# Patient Record
Sex: FEMALE | Race: WHITE | Employment: OTHER | URBAN - METROPOLITAN AREA
[De-identification: names, ages, dates, MRNs, and addresses within clinical notes are randomized per-mention and may not be internally consistent; named-entity substitution may affect disease eponyms.]

---

## 2017-01-01 ENCOUNTER — OFFICE VISIT (OUTPATIENT)
Dept: PAIN MANAGEMENT | Age: 67
End: 2017-01-01

## 2017-01-01 VITALS
HEART RATE: 69 BPM | DIASTOLIC BLOOD PRESSURE: 60 MMHG | SYSTOLIC BLOOD PRESSURE: 105 MMHG | TEMPERATURE: 98.4 F | RESPIRATION RATE: 14 BRPM | BODY MASS INDEX: 23.92 KG/M2 | WEIGHT: 162 LBS

## 2017-01-01 DIAGNOSIS — G89.4 CHRONIC PAIN SYNDROME: ICD-10-CM

## 2017-01-01 DIAGNOSIS — R51.9 CHRONIC INTRACTABLE HEADACHE, UNSPECIFIED HEADACHE TYPE: ICD-10-CM

## 2017-01-01 DIAGNOSIS — M79.605 PAIN OF LEFT LOWER EXTREMITY: ICD-10-CM

## 2017-01-01 DIAGNOSIS — G89.29 CHRONIC INTRACTABLE HEADACHE, UNSPECIFIED HEADACHE TYPE: ICD-10-CM

## 2017-01-01 DIAGNOSIS — M79.2 NEURALGIA: ICD-10-CM

## 2017-01-01 DIAGNOSIS — Z89.612 STATUS POST ABOVE KNEE AMPUTATION OF LEFT LOWER EXTREMITY: ICD-10-CM

## 2017-01-01 DIAGNOSIS — G54.6 PHANTOM LIMB PAIN (HCC): Primary | ICD-10-CM

## 2017-01-01 DIAGNOSIS — G89.29 INSOMNIA SECONDARY TO CHRONIC PAIN: ICD-10-CM

## 2017-01-01 DIAGNOSIS — G47.01 INSOMNIA SECONDARY TO CHRONIC PAIN: ICD-10-CM

## 2017-01-01 DIAGNOSIS — I73.9 PAD (PERIPHERAL ARTERY DISEASE) (HCC): ICD-10-CM

## 2017-01-01 DIAGNOSIS — Z79.899 ENCOUNTER FOR LONG-TERM (CURRENT) DRUG USE: ICD-10-CM

## 2017-01-01 DIAGNOSIS — Z79.899 ENCOUNTER FOR LONG-TERM (CURRENT) USE OF HIGH-RISK MEDICATION: ICD-10-CM

## 2017-01-01 RX ORDER — METHADONE HYDROCHLORIDE 10 MG/1
20 TABLET ORAL 4 TIMES DAILY
Qty: 240 TAB | Refills: 0 | Status: SHIPPED | OUTPATIENT
Start: 2017-01-01 | End: 2018-01-01 | Stop reason: SDUPTHER

## 2017-01-01 RX ORDER — METHADONE HYDROCHLORIDE 10 MG/1
20 TABLET ORAL 4 TIMES DAILY
Qty: 240 TAB | Refills: 0 | Status: SHIPPED | OUTPATIENT
Start: 2017-01-01 | End: 2017-01-01 | Stop reason: SDUPTHER

## 2017-01-01 RX ORDER — OXYCODONE HYDROCHLORIDE 15 MG/1
15 TABLET ORAL
Qty: 120 TAB | Refills: 0 | Status: SHIPPED | OUTPATIENT
Start: 2017-01-01 | End: 2018-01-01 | Stop reason: SDUPTHER

## 2017-01-01 RX ORDER — OXYCODONE HYDROCHLORIDE 15 MG/1
15 TABLET ORAL
Qty: 120 TAB | Refills: 0 | Status: SHIPPED | OUTPATIENT
Start: 2017-01-01 | End: 2017-01-01 | Stop reason: SDUPTHER

## 2017-01-01 RX ORDER — ENOXAPARIN SODIUM 150 MG/ML
120 INJECTION SUBCUTANEOUS DAILY
COMMUNITY
Start: 2017-09-18

## 2017-02-07 ENCOUNTER — OFFICE VISIT (OUTPATIENT)
Dept: PAIN MANAGEMENT | Age: 67
End: 2017-02-07

## 2017-02-07 VITALS
DIASTOLIC BLOOD PRESSURE: 55 MMHG | HEART RATE: 62 BPM | WEIGHT: 153 LBS | SYSTOLIC BLOOD PRESSURE: 110 MMHG | BODY MASS INDEX: 22.66 KG/M2 | HEIGHT: 69 IN

## 2017-02-07 DIAGNOSIS — I73.9 PAD (PERIPHERAL ARTERY DISEASE) (HCC): ICD-10-CM

## 2017-02-07 DIAGNOSIS — G54.6 PHANTOM LIMB PAIN (HCC): Primary | ICD-10-CM

## 2017-02-07 DIAGNOSIS — M79.605 PAIN OF LEFT LOWER EXTREMITY: ICD-10-CM

## 2017-02-07 DIAGNOSIS — M79.2 NEURALGIA: ICD-10-CM

## 2017-02-07 DIAGNOSIS — Z89.612 STATUS POST ABOVE KNEE AMPUTATION OF LEFT LOWER EXTREMITY: ICD-10-CM

## 2017-02-07 DIAGNOSIS — Z79.899 ENCOUNTER FOR LONG-TERM (CURRENT) DRUG USE: ICD-10-CM

## 2017-02-07 DIAGNOSIS — G89.4 CHRONIC PAIN SYNDROME: ICD-10-CM

## 2017-02-07 LAB
ALCOHOL UR POC: NORMAL
AMPHETAMINES UR POC: NORMAL
BARBITURATES UR POC: NORMAL
BENZODIAZEPINES UR POC: NORMAL
BUPRENORPHINE UR POC: NORMAL
CANNABINOIDS UR POC: NORMAL
CARISOPRODOL UR POC: NORMAL
COCAINE UR POC: NORMAL
FENTANYL UR POC: NORMAL
MDMA/ECSTASY UR POC: NORMAL
METHADONE UR POC: NORMAL
METHAMPHETAMINE UR POC: NORMAL
METHYLPHENIDATE UR POC: NORMAL
OPIATES UR POC: NORMAL
OXYCODONE UR POC: NORMAL
PHENCYCLIDINE UR POC: NORMAL
PROPOXYPHENE UR POC: NORMAL
TRAMADOL UR POC: NORMAL
TRICYCLICS UR POC: NORMAL

## 2017-02-07 RX ORDER — HYDROCODONE BITARTRATE AND ACETAMINOPHEN 10; 325 MG/1; MG/1
1 TABLET ORAL
Qty: 120 TAB | Refills: 0 | Status: SHIPPED | OUTPATIENT
Start: 2017-02-07 | End: 2017-02-07 | Stop reason: SDUPTHER

## 2017-02-07 RX ORDER — METHADONE HYDROCHLORIDE 10 MG/1
20 TABLET ORAL 4 TIMES DAILY
Qty: 240 TAB | Refills: 0 | Status: SHIPPED | OUTPATIENT
Start: 2017-02-18 | End: 2017-02-07 | Stop reason: SDUPTHER

## 2017-02-07 RX ORDER — METHADONE HYDROCHLORIDE 10 MG/1
20 TABLET ORAL 4 TIMES DAILY
Qty: 240 TAB | Refills: 0 | Status: SHIPPED | OUTPATIENT
Start: 2017-04-17 | End: 2017-05-02 | Stop reason: SDUPTHER

## 2017-02-07 RX ORDER — HYDROCODONE BITARTRATE AND ACETAMINOPHEN 10; 325 MG/1; MG/1
1 TABLET ORAL
Qty: 120 TAB | Refills: 0 | Status: SHIPPED | OUTPATIENT
Start: 2017-04-06 | End: 2017-05-02

## 2017-02-07 RX ORDER — HYDROCODONE BITARTRATE AND ACETAMINOPHEN 10; 325 MG/1; MG/1
1 TABLET ORAL
Qty: 120 TAB | Refills: 0 | Status: SHIPPED | OUTPATIENT
Start: 2017-03-08 | End: 2017-02-07 | Stop reason: SDUPTHER

## 2017-02-07 RX ORDER — METHADONE HYDROCHLORIDE 10 MG/1
20 TABLET ORAL 4 TIMES DAILY
Qty: 240 TAB | Refills: 0 | Status: SHIPPED | OUTPATIENT
Start: 2017-03-19 | End: 2017-02-07 | Stop reason: SDUPTHER

## 2017-02-07 NOTE — PATIENT INSTRUCTIONS
Plan:  Continue same medications as prescribed for chronic pain  Stop oxycodone and change to Norco 10/325 up to four times daily as needed for pain flares  Research [employer] Janes DRG stimulator FOR PHANTOM LIMB PAIN  Follow up in 3 months or sooner if needed  Regular exercise and attention to emotional health and diet remain the most effective ways to treat chronic pain of all kinds  You may contact me with questions or concerns through [address] [place] [address] are in possession of medication (OXYCODONE 15 MG) that is no longer part of your active treatment regimen. We strongly advise that you properly dispose of it as quickly as possible to help reduce the chance that others may accidentally take or intentionally misuse the discontinued medication. Please understand that ongoing use or distribution of a discontinued medication containing a controlled substance is a violation of your signed Controlled Substance Consent & Treatment Agreement and will result in dismissal from our practice. Listed below are some options and special instructions to consider when disposing of discontinued, , unwanted, or unused medications:    · Drug [employer] (GENO) authorized collectors cansafely and securely collect and dispose of pharmaceuticals containing controlled substances and other medications. Authorized collection sites may be [employer], hospital or clinic pharmacies, and law enforcement locations. For GENO-authorized collectors near you, contact the [place] of [address] [facility] at [phone] or visit the following web address: [employer].. ????.gov/pubdispsearch/spring/main?execution=e1s1.       GENO-authorized collectors within the local [address] [place] [address] include:  [address] [address], [address]    [facility]  BHARATI/Ori Elise, [address] , [place] [address] PHARMACY AT [facility]  [address] [place] [address], [address]    [address]  [address], [address], [address]    · Medicine take-back programs are another good way to safely dispose of most types of discontinued medications. The GENO periodically hosts National Prescription Drug Take-Back events where collection sites are set up in communities nationwide for safe disposal of prescription drugs. Local law enforcement agencies may also sponsor medication take-back programs. · If unable to reach a medication take-back program or GENO-authorized , you can also follow these simple steps to dispose of medications in the household trash:  1. Mix medications (do not crush tablets or capsules) with an unappetizing substance such as dirt, kade litter, or used coffee grounds;  2. Place the mixture in a container such as a sealed plastic bag;  3. Throw the container in your household trash;  Scratch out all personal information on the prescription label of your empty pill bottle or empty medication packaging to make it unreadable, then dispose of the container.

## 2017-02-07 NOTE — PROGRESS NOTES
HISTORY OF PRESENT ILLNESS  Eliot Oshea is a 73 y.o. female. She returns for followup of chronic, severe pain which includes phantom pain status post left AKA as well as chronic bilateral leg pain due to severe PAD. She reports that recent fluctuations in the weather has worsened pain significantly. Pain continues to predominate in the left leg stump, which she describes as burning, stabbing, and sharp. Pain is constant but worsens with physical exertion, inclement weather, and stress. She has recently restarted smoking due to the stress of her pain, and we discussed the potential ramifications of this, and encouraged that she quit again. She reports that methadone was not adequately managing her chronic pain alone, so Dr. Laith Silva restarted oxycodone for breakthrough pain at her last office visit. However, she notes that she has difficulty breathing for several hours after taking a dose of oxycodone. We discussed that this is an potentially serious side effect, particularly with her comorbid health conditions and recommend she change to a less-potent opioid for breakthrough pain. We discussed treatment options at length--see treatment plan below. The remainder of her pain generators remain constant but stable. Pt reports average of 5/10 pain scale most days. She denies any new symptoms. Methadone 10 mg continues to work modestly well for pain control overall. Eliot Oshea is tolerating medications well, with no untoward side effects noted. She is able to stay more active with less discomfort with these current doses. The patient reports an average of 40 % relief with this regimen. Most recent UDS and  were consistent with prescribed medications. Pill counts are appropriate.  She is informed of side effects, risks, and benefits of this regimen, and emphasizes that she derives a significant improvement in functionality and quality of life, and notes that non-opioid medications and therapies in the past have not offered significant benefit. She denies new or worsening insomnia or constipation issues. She denies any falls, injuries, or hospitalizations since the last visit. A total of 45 minutes was spent with the patient of which more than 50% of the time was spent counseling the patient. HPI--see above    ROS    Physical Exam    ASSESSMENT and PLAN    ICD-10-CM ICD-9-CM    1. Phantom limb pain (HCC) G54.6 353.6    2. Neuralgia M79.2 729.2    3. Pain of left lower extremity M79.605 729.5    4. Status post above knee amputation of left lower extremity ([facility] Z89.612 V49.76    5. Encounter for long-term (current) drug use Z79.899 V58.69 DRUG SCREEN      AMB POC DRUG SCREEN ()   6. Chronic pain syndrome G89.4 338.4    7.  PAD (peripheral artery disease) ([facility]) I73.9 443.9       Plan:  Continue same medications as prescribed for chronic pain  Stop oxycodone and change to Norco 10/325 up to four times daily as needed for pain flares  Research [employer] Janes DRG stimulator  Follow up in 3 months or sooner if needed  Regular exercise and attention to emotional health and diet remain the most effective ways to treat chronic pain of all kinds  You may contact me with questions or concerns through [address]

## 2017-02-07 NOTE — PROGRESS NOTES
Nursing Notes    Patient presents to the office today in follow-up. Patient rates her pain at 4/10 on the numerical pain scale. Reviewed medications with counts as follows:    Rx Date filled Qty Dispensed Pill Count Last Dose Short   Methadone 10 mg  01/20/17 240 103 today no   Oxycodone 15 mg  01/15/17 120 60 yesterday no                           POC UDS was performed in office today    Any new labs or imaging since last appointment? NO    Have you been to an emergency room (ER) or urgent care clinic since your last visit? NO            Have you been hospitalized since your last visit? NO     If yes, where, when, and reason for visit? Have you seen or consulted any other health care providers outside of the [address] [place] [address]  since your last visit? YES     If yes, where, when, and reason for visit? GI    HM deferred to pcp.

## 2017-02-07 NOTE — MR AVS SNAPSHOT
Visit Information Date & Time Provider Department Dept. Phone Encounter #  
 2/7/2017  2:00 PM Castro Salguero [facility] [phone] [id number] Follow-up Instructions Return in about 3 months (around 5/7/2017). Upcoming Health Maintenance Date Due Hepatitis C Screening 1950 DTaP/Tdap/Td series (1 - Tdap) 10/30/1972 BREAST CANCER SCRN MAMMOGRAM 10/30/2001 FOBT Q 1 YEAR AGE 50-75 10/30/2001 ZOSTER VACCINE AGE 60> 10/30/2011 INFLUENZA AGE 9 TO ADULT 8/1/2016 GLAUCOMA SCREENING Q2Y 10/30/2016 OSTEOPOROSIS SCREENING (DEXA) 10/30/2016 Pneumococcal 65+ Low/Medium Risk (1 of 2 - PCV13) 10/30/2016 MEDICARE YEARLY EXAM 10/30/2016 Allergies as of 2/7/2017  Review Complete On: 2/7/2017 By: Amanda Agustin LPN Severity Noted Reaction Type Reactions Heparin Analogues  08/19/2014   Side Effect Other (comments) Blood clotted resulted her to have amputation of the leg Iodinated Contrast Media - Oral And Iv Dye    Hives Current Immunizations  Never Reviewed No immunizations on file. Not reviewed this visit You Were Diagnosed With   
  
 Codes Comments Phantom limb pain ([facility]    -  Primary ICD-10-CM: G54.6 ICD-9-CM: 353.6 Neuralgia     ICD-10-CM: M79.2 ICD-9-CM: 729.2 Pain of left lower extremity     ICD-10-CM: M79.605 ICD-9-CM: 729.5 Status post above knee amputation of left lower extremity ([facility]     ICD-10-CM: J09.213 ICD-9-CM: V49.76 Encounter for long-term (current) drug use     ICD-10-CM: Z79.899 ICD-9-CM: V58.69 Chronic pain syndrome     ICD-10-CM: G89.4 ICD-9-CM: 338.4 PAD (peripheral artery disease) (HCC)     ICD-10-CM: I73.9 ICD-9-CM: 443. 9 Vitals BP Pulse Height(growth percentile) Weight(growth percentile) BMI OB Status 110/55 62 5' 9\" (1.753 m) 153 lb (69.4 kg) 22.59 kg/m2 Hysterectomy Smoking Status Former Smoker Vitals History BMI and BSA Data Body Mass Index Body Surface Area  
 22.59 kg/m 2 1.84 m 2 Preferred Pharmacy Pharmacy Name Phone [id number] - [place] [phone] Your Updated Medication List  
  
   
This list is accurate as of: 2/7/17  3:01 PM.  Always use your most recent med list.  
  
  
  
  
 albuterol 90 mcg/actuation inhaler Commonly known as:  PROVENTIL HFA, VENTOLIN HFA, PROAIR HFA Take 2 Puffs by inhalation as needed for Wheezing. Indications: asthma  
  
 atorvastatin 80 mg tablet Commonly known as:  LIPITOR Take 80 mg by mouth nightly. Indications: HYPERCHOLESTEROLEMIA  
  
 carvedilol 12.5 mg tablet Commonly known as:  Monda Lovings Take 12.5 mg by mouth two (2) times daily (with meals). Indications: HYPERTENSION, CHF  
  
 CORRECTOL 5 mg Tab Generic drug:  bisacodyl Take  by mouth. furosemide 40 mg tablet Commonly known as:  LASIX Take 40 mg by mouth daily as needed. gabapentin 800 mg tablet Commonly known as:  NEURONTIN Take 300 mg by mouth three (3) times daily. Indications: NEUROPATHIC PAIN  
  
 guaiFENesin  mg ER tablet Commonly known as:  Cm & Cm Take 600 mg by mouth two (2) times daily as needed for Congestion. HYDROcodone-acetaminophen  mg tablet Commonly known as:  Redd Minor Take 1 Tab by mouth four (4) times daily as needed for Pain. Max Daily Amount: 4 Tabs. Start taking on:  4/6/2017 Iron 325 mg (65 mg iron) tablet Generic drug:  ferrous sulfate Take  by mouth two (2) times daily (with meals). LORazepam 1 mg tablet Commonly known as:  ATIVAN Take 0.5 mg by mouth nightly as needed for Anxiety. losartan 25 mg tablet Commonly known as:  COZAAR Take 25 mg by mouth daily. Indications: HYPERTENSION  
  
 methadone 10 mg tablet Commonly known as:  DOLOPHINE  
 Take 2 Tabs by mouth four (4) times daily for 30 days. Max Daily Amount: 80 mg. for chronic, severe, refractory pain. Indications: Chronic Pain, Severe Pain Start taking on:  4/17/2017  
  
 omeprazole 20 mg capsule Commonly known as:  PRILOSEC Take 20 mg by mouth daily. Indications: GASTROESOPHAGEAL REFLUX polyvinyl alcohol 1.4 % ophthalmic solution Commonly known as:  Camilo Bitters Administer 1 Drop to both eyes as needed. potassium chloride 20 mEq packet Commonly known as:  KLOR-CON Take 20 mEq by mouth two (2) times a day. Indications: HYPOKALEMIA PREVENTION  
  
 senna-docusate 8.6-50 mg per tablet Commonly known as:  Miachel Huge Take 1 Tab by mouth as needed for Constipation. Alternates with Docusate sodium. spironolactone 25 mg tablet Commonly known as:  ALDACTONE Take 25 mg by mouth daily. Indications: EDEMA  
  
 tiotropium 18 mcg inhalation capsule Commonly known as:  Nighat Gilmer Take 1 Cap by inhalation two (2) times a day. venlafaxine- mg capsule Commonly known as:  EFFEXOR-XR  
TAKE 1 CAPSULE BY MOUTH DAILY FOR 30 DAYS  
  
 XARELTO 10 mg tablet Generic drug:  rivaroxaban Take  by mouth daily. Prescriptions Printed Refills  
 methadone (DOLOPHINE) 10 mg tablet 0 Starting on: 4/17/2017 Sig: Take 2 Tabs by mouth four (4) times daily for 30 days. Max Daily Amount: 80 mg. for chronic, severe, refractory pain. Indications: Chronic Pain, Severe Pain Class: Print Route: Oral  
 HYDROcodone-acetaminophen (NORCO)  mg tablet 0 Starting on: 4/6/2017 Sig: Take 1 Tab by mouth four (4) times daily as needed for Pain. Max Daily Amount: 4 Tabs. Class: Print Route: Oral  
  
Follow-up Instructions Return in about 3 months (around 5/7/2017). Patient Instructions Plan: 
Continue same medications as prescribed for chronic pain Stop oxycodone and change to Norco 10/325 up to four times daily as needed for pain flares Research [employer] Janes DRG stimulator FOR PHANTOM LIMB PAIN Follow up in 3 months or sooner if needed Regular exercise and attention to emotional health and diet remain the most effective ways to treat chronic pain of all kinds You may contact me with questions or concerns through [address] You are in possession of medication (OXYCODONE 15 MG) that is no longer part of your active treatment regimen. We strongly advise that you properly dispose of it as quickly as possible to help reduce the chance that others may accidentally take or intentionally misuse the discontinued medication. Please understand that ongoing use or distribution of a discontinued medication containing a controlled substance is a violation of your signed Controlled Substance Consent & Treatment Agreement and will result in dismissal from our practice. Listed below are some options and special instructions to consider when disposing of discontinued, , unwanted, or unused medications: · Drug Encorcement Agency (GENO) authorized collectors cansafely and securely collect and dispose of pharmaceuticals containing controlled substances and other medications. Authorized collection sites may be [employer], hospital or clinic pharmacies, and law enforcement locations. For GENO-authorized collectors near you, contact the [place] of [address] [address] [facility] at [phone] or visit the following web address: [email or web address]/pubdispsearch/spring/main?execution=e1s1. GENO-authorized collectors within the local [address] [place] [address] include: 
[address], [address] [facility] 
BHARATI/Ori Elise [place] [address] [place] [address] [place], [address] 
[address] [place], [place] [address], [address] · Medicine take-back programs are another good way to safely dispose of most types of discontinued medications. The GNEO periodically hosts National Prescription Drug Take-Back events where collection sites are set up in communities nationwide for safe disposal of prescription drugs. Local law enforcement agencies may also sponsor medication take-back programs. · If unable to reach a medication take-back program or GENO-authorized , you can also follow these simple steps to dispose of medications in the household trash: 
1. Mix medications (do not crush tablets or capsules) with an unappetizing substance such as dirt, kade litter, or used coffee grounds; 2. Place the mixture in a container such as a sealed plastic bag; 
3. Throw the container in your household trash; 
Scratch out all personal information on the prescription label of your empty pill bottle or empty medication packaging to make it unreadable, then dispose of the container. Introducing [facility] & HEALTH SERVICES! Dear Lilyan Severs: Thank you for requesting a [employer] account. Our records indicate that you already have an active [employer] account. You can access your account anytime at [email or web address] Did you know that you can access your hospital and ER discharge instructions at any time in [employer]? You can also review all of your test results from your hospital stay or ER visit. Additional Information If you have questions, please visit the Frequently Asked Questions section of the [employer] website at [email or web address] Remember, [employer] is NOT to be used for urgent needs. For medical emergencies, dial 911. Now available from your iPhone and Android! Please provide this summary of care documentation to your next provider. Your primary care clinician is listed as Barry Garcia. If you have any questions after today's visit, please call [phone].

## 2017-05-02 ENCOUNTER — OFFICE VISIT (OUTPATIENT)
Dept: PAIN MANAGEMENT | Age: 67
End: 2017-05-02

## 2017-05-02 VITALS
HEIGHT: 69 IN | BODY MASS INDEX: 22.66 KG/M2 | HEART RATE: 67 BPM | WEIGHT: 153 LBS | SYSTOLIC BLOOD PRESSURE: 110 MMHG | DIASTOLIC BLOOD PRESSURE: 59 MMHG

## 2017-05-02 DIAGNOSIS — N13.30 HYDRONEPHROSIS, BILATERAL: ICD-10-CM

## 2017-05-02 DIAGNOSIS — Z79.899 ENCOUNTER FOR LONG-TERM (CURRENT) USE OF HIGH-RISK MEDICATION: ICD-10-CM

## 2017-05-02 DIAGNOSIS — Z89.612 STATUS POST ABOVE KNEE AMPUTATION OF LEFT LOWER EXTREMITY: ICD-10-CM

## 2017-05-02 DIAGNOSIS — G54.6 PHANTOM LIMB PAIN (HCC): Primary | ICD-10-CM

## 2017-05-02 DIAGNOSIS — I73.9 PAD (PERIPHERAL ARTERY DISEASE) (HCC): ICD-10-CM

## 2017-05-02 DIAGNOSIS — M79.605 PAIN OF LEFT LOWER EXTREMITY: ICD-10-CM

## 2017-05-02 DIAGNOSIS — G47.01 INSOMNIA SECONDARY TO CHRONIC PAIN: ICD-10-CM

## 2017-05-02 DIAGNOSIS — M79.2 NEURALGIA: ICD-10-CM

## 2017-05-02 DIAGNOSIS — Z79.899 ENCOUNTER FOR LONG-TERM (CURRENT) DRUG USE: ICD-10-CM

## 2017-05-02 DIAGNOSIS — G89.29 INSOMNIA SECONDARY TO CHRONIC PAIN: ICD-10-CM

## 2017-05-02 RX ORDER — METHADONE HYDROCHLORIDE 10 MG/1
20 TABLET ORAL 4 TIMES DAILY
Qty: 240 TAB | Refills: 0 | Status: SHIPPED | OUTPATIENT
Start: 2017-05-16 | End: 2017-06-27 | Stop reason: SDUPTHER

## 2017-05-02 NOTE — PROGRESS NOTES
HISTORY OF PRESENT ILLNESS  Lawrence Andrew is a 73 y.o. female. She returns for followup of chronic, severe pain which includes phantom pain status post left AKA as well as chronic bilateral leg pain due to severe PAD. She reports that the change to Norco from percocet for breakthrough pain has not been effective for her severe pain flares. She reports that pain has been severe since her last visit in spite of her high methadone doses, and her quality of life and functionality is rated as poor. Pain continues to predominate in the left leg stump, which she describes as burning, stabbing, and sharp. Pain is constant but worsens with physical exertion, inclement weather, and stress. Pt reports average of 7/10 pain scale most days with medications. She also reports that her chronic anemia has become profound, and she underwent transfusion about two weeks ago when her Hbg reached 7.2. She reports that she is losing blood in her urine due to her in-dwelling uretal stents. She also tested positive on fecal occult blood, but she has not been able to have a colonoscopy or endoscopy due to her underlying cardiac issues and chronic [place]. She denies taking any NSAIDs, but does admit to taking additional Tylenol due to increased pain. She is also still being treated for cardiovascular disease, and has an occlusion that likely needs stenting, but she cannot have this performed due to profound anemia. \"It's a dog chasing its tail. \" she is experiencing frequent attacks of angina, which is managed also by her cardiologist. She has quit smoking in the past three months. She is not considered a interventional or surgical candidate for her pain due to her extensive medical co morbidities. This may change pending improvement with anemia and cardiovascular issues. She does not feel that her medications are offering any meaningful pain relief.  She would like to switch to a different long acting medication, which will be difficult considering her very high doses of methadone (currently at 80 mg per day, which is 400 MME/day). She has also tried and failed Exalgo, fentanyl, morphine, hydrocodone, and oxycodone. After prolonged discussion on the risks and limitations of opioids in treating chronic pain, she elects to stay with methadone one more month and switch her breakthrough pain medication to Nucynta 50 mg , which will hopefully be more successful in treating her neuropathic pain. She denies new or worsening insomnia or constipation issues. A total of 50 minutes was spent with the patient of which more than 50% of the time was spent counseling the patient. HPI--see above    ROS  Constitutional: Negative for weight loss. Respiratory: Negative for shortness of breath. Gastrointestinal: Negative for constipation. Musculoskeletal: Positive for myalgias, back pain, joint pain and neck pain. Negative for falls. Skin: Negative for rash. Neurological: Positive for sensory change. Memory loss   Physical Exam  Constitutional: She is oriented to person, place, and time. She appears well-developed and well-nourished. She is cooperative. No distress. HENT:   Head: Normocephalic and atraumatic. Pulmonary/Chest: Effort normal. No respiratory distress. Musculoskeletal:        Right upper leg: She exhibits deformity (AKA). Neurological: She is alert and oriented to person, place, and time. Gait abnormal.   Skin: Skin is warm and dry. She is not diaphoretic. Psychiatric: She has a normal mood and affect. Her speech is normal and behavior is normal. Thought content normal.   ASSESSMENT and PLAN    ICD-10-CM ICD-9-CM    1. Phantom limb pain (HCC) G54.6 353.6    2. Neuralgia M79.2 729.2    3. Pain of left lower extremity M79.605 729.5    4. Status post above knee amputation of left lower extremity ([facility] Z89.612 V49.76    5. Encounter for long-term (current) drug use Z79.899 V58.69    6.  PAD (peripheral artery disease) ([facility] I73.9 443.9    7. Encounter for long-term (current) use of high-risk medication Z79.899 V58.69    8. Hydronephrosis, bilateral N13.30 591    9.  Insomnia secondary to chronic pain G89.29 338.29     G47.01 327.01       Plan:  Continue same dose of methadone as prescribed for chronic pain for now  We will try Nucynta 100 mg for severe pain flares to see if this better addresses your phantom limb nerve pain  Stop [place]  Follow up in 1 months or sooner if needed  Regular exercise and attention to emotional health and diet remain the most effective ways to treat chronic pain of all kinds  You may contact me with questions or concerns through [employer]

## 2017-05-02 NOTE — PATIENT INSTRUCTIONS
Plan:  Continue same dose of methadone as prescribed for chronic pain for now  We will try Nucynta 100 mg for severe pain flares to see if this better addresses your phantom limb nerve pain  Per the [facility] recommendation, we will prescribe Naloxone 4 mg nasal spray to use in case of accidental overdose. Do no use Naloxone for any reasons other than opioid toxicity, as its use may precipitate withdrawals. Stop Norco  Follow up in 1 months or sooner if needed  Regular exercise and attention to emotional health and diet remain the most effective ways to treat chronic pain of all kinds  You may contact me with questions or concerns through [address] [place] [address] are in possession of medication that is no longer part of your active treatment regimen ( Joyice Breeze and oxycodone). We strongly advise that you properly dispose of it as quickly as possible to help reduce the chance that others may accidentally take or intentionally misuse the discontinued medication. Please understand that ongoing use or distribution of a discontinued medication containing a controlled substance is a violation of your signed Controlled Substance Consent & Treatment Agreement and will result in dismissal from our practice. Listed below are some options and special instructions to consider when disposing of discontinued, , unwanted, or unused medications:    · Drug [employer] (GENO) authorized collectors cansafely and securely collect and dispose of pharmaceuticals containing controlled substances and other medications. Authorized collection sites may be [employer], hospital or clinic pharmacies, and law enforcement locations. For GENO-authorized collectors near you, contact the [facility] of [address] [place] [address] at [phone] or visit the following web address: [email or web address] Easy [employer]i.gov/pubdispsearch/spring/main?execution=[email or web address].       GENO-authorized collectors within the local [address] [place] [address] include:  [facility]  [address] [facility]  Emilie, [address]    [facility]  [facility]  BHARATI/Santino Carvajal [address] , [facility] [address]    [address]  [address] Corrine Blake [address], [address]    [address]  [address], Santino [address], [address] [facility]    · Medicine take-back programs are another good way to safely dispose of most types of discontinued medications. The [employer]ENO periodically hosts National Prescription Drug Take-Back events where collection sites are set up in communities nationwide for safe disposal of prescription drugs. Local law enforcement agencies may also sponsor medication take-back programs. · If unable to reach a medication take-back program or GENO-authorized , you can also follow these simple steps to dispose of medications in the household trash:  1. Mix medications (do not crush tablets or capsules) with an unappetizing substance such as dirt, kade litter, or used coffee grounds;  2. Place the mixture in a container such as a sealed plastic bag;  3. Throw the container in your household trash;  4. Scratch out all personal information on the prescription label of your empty pill bottle or empty medication packaging to make it unreadable, then dispose of the container.

## 2017-05-02 NOTE — PROGRESS NOTES
Nursing Notes    Patient presents to the office today in follow-up. Patient rates her pain at 5/10 on the numerical pain scale. Reviewed medications with counts as follows:    Rx Date filled Qty Dispensed Pill Count Last Dose Short   norco 10/325 mg 04/06/17 120 18 today no   Methadone 10 mg  5/01/17 240 240 today no                            POC UDS was performed in office today. Any new labs or imaging since last appointment? NO    Have you been to an emergency room (ER) or urgent care clinic since your last visit? NO            Have you been hospitalized since your last visit? NO     If yes, where, when, and reason for visit? Have you seen or consulted any other health care providers outside of the [address] [place] [address]  since your last visit? YES     If yes, where, when, and reason for visit? Cardiology    HM deferred to pcp.

## 2017-05-02 NOTE — MR AVS SNAPSHOT
Visit Information Date & Time Provider Department Dept. Phone Encounter #  
 5/2/2017  2:00 PM Andrés Cadena [address] [place] [address] for Pain Management [id number] Follow-up Instructions Return in about 1 month (around 6/2/2017). Follow-up and Disposition History Upcoming Health Maintenance Date Due Hepatitis C Screening 1950 DTaP/Tdap/Td series (1 - Tdap) 10/30/1972 BREAST CANCER SCRN MAMMOGRAM 10/30/2001 FOBT Q 1 YEAR AGE 50-75 10/30/2001 ZOSTER VACCINE AGE 60> 10/30/2011 GLAUCOMA SCREENING Q2Y 10/30/2016 OSTEOPOROSIS SCREENING (DEXA) 10/30/2016 Pneumococcal 65+ Low/Medium Risk (1 of 2 - PCV13) 10/30/2016 MEDICARE YEARLY EXAM 10/30/2016 INFLUENZA AGE 9 TO ADULT 8/1/2017 Allergies as of 5/2/2017  Review Complete On: 5/2/2017 By: Jonny Nunez LPN Severity Noted Reaction Type Reactions Heparin Analogues  08/19/2014   Side Effect Other (comments) Blood clotted resulted her to have amputation of the leg Iodinated Contrast Media - Oral And Iv Dye    Hives Current Immunizations  Never Reviewed No immunizations on file. Not reviewed this visit You Were Diagnosed With   
  
 Codes Comments Phantom limb [facility] ([facility]    -  Primary ICD-10-CM: G54.6 ICD-9-CM: 353.6 Neuralgia     ICD-10-CM: M79.2 ICD-9-CM: 729.2 Pain of left lower extremity     ICD-10-CM: M79.605 ICD-9-CM: 729.5 Status post above knee amputation of left lower extremity ([facility]     ICD-10-CM: Z88.293 ICD-9-CM: V49.76 Encounter for long-term (current) drug use     ICD-10-CM: Z79.899 ICD-9-CM: V58.69 PAD (peripheral artery disease) (HCC)     ICD-10-CM: I73.9 ICD-9-CM: 443.9 Encounter for long-term (current) use of high-risk medication     ICD-10-CM: Z79.899 ICD-9-CM: V58.69 Hydronephrosis, bilateral     ICD-10-CM: N13.30 ICD-9-CM: 242 Insomnia secondary to chronic pain     ICD-10-CM: G89.29, G47.01 
ICD-9-CM: 338.29, 327.01 Vitals BP Pulse Height(growth percentile) Weight(growth percentile) BMI OB Status 110/59 67 5' 9\" (1.753 m) 153 lb (69.4 kg) 22.59 kg/m2 Hysterectomy Smoking Status Former Smoker Vitals History BMI and BSA Data Body Mass Index Body Surface Area  
 22.59 kg/m 2 1.84 m 2 Preferred Pharmacy Pharmacy Name Phone [id number] - [address], [address] [phone] Your Updated Medication List  
  
   
This list is accurate as of: 5/2/17  3:12 PM.  Always use your most recent med list.  
  
  
  
  
 albuterol 90 mcg/actuation inhaler Commonly known as:  PROVENTIL HFA, VENTOLIN HFA, PROAIR HFA Take 2 Puffs by inhalation as needed for Wheezing. Indications: asthma  
  
 atorvastatin 80 mg tablet Commonly known as:  LIPITOR Take 80 mg by mouth nightly. Indications: HYPERCHOLESTEROLEMIA  
  
 carvedilol 12.5 mg tablet Commonly known as:  Geannie Clos Take 12.5 mg by mouth two (2) times daily (with meals). Indications: HYPERTENSION, CHF  
  
 CORRECTOL 5 mg Tab Generic drug:  bisacodyl Take  by mouth. furosemide 40 mg tablet Commonly known as:  LASIX Take 40 mg by mouth daily as needed. gabapentin 800 mg tablet Commonly known as:  NEURONTIN Take 300 mg by mouth three (3) times daily. Indications: NEUROPATHIC PAIN  
  
 guaiFENesin  mg ER tablet Commonly known as:  Cm & Cm Take 600 mg by mouth two (2) times daily as needed for Congestion. Iron 325 mg (65 mg iron) tablet Generic drug:  ferrous sulfate Take  by mouth two (2) times daily (with meals). LORazepam 1 mg tablet Commonly known as:  ATIVAN Take 0.5 mg by mouth nightly as needed for Anxiety. losartan 25 mg tablet Commonly known as:  COZAAR Take 25 mg by mouth daily.  Indications: HYPERTENSION  
  
 methadone 10 mg tablet Commonly known as:  DOLOPHINE Take 2 Tabs by mouth four (4) times daily for 30 days. Max Daily Amount: 80 mg. for chronic, severe, refractory pain. Indications: Chronic Pain, Severe Pain Start taking on:  5/16/2017  
  
 omeprazole 20 mg capsule Commonly known as:  PRILOSEC Take 20 mg by mouth daily. Indications: GASTROESOPHAGEAL REFLUX polyvinyl alcohol 1.4 % ophthalmic solution Commonly known as:  Electa Prateek Administer 1 Drop to both eyes as needed. potassium chloride 20 mEq packet Commonly known as:  KLOR-CON Take 20 mEq by mouth two (2) times a day. Indications: HYPOKALEMIA PREVENTION  
  
 senna-docusate 8.6-50 mg per tablet Commonly known as:  Cindra Bullion Take 1 Tab by mouth as needed for Constipation. Alternates with Docusate sodium. spironolactone 25 mg tablet Commonly known as:  ALDACTONE Take 25 mg by mouth daily. Indications: EDEMA  
  
 tapentadol 100 mg tablet Commonly known as:  Rusty Duong Take 100 mg by mouth four (4) times daily as needed for Pain. Max Daily Amount: 400 mg.  
  
 tiotropium 18 mcg inhalation capsule Commonly known as:  Tacey Ld Take 1 Cap by inhalation two (2) times a day. venlafaxine- mg capsule Commonly known as:  EFFEXOR-XR  
TAKE 1 CAPSULE BY MOUTH DAILY FOR 30 DAYS  
  
 XARELTO 10 mg tablet Generic drug:  rivaroxaban Take  by mouth daily. Prescriptions Printed Refills  
 methadone (DOLOPHINE) 10 mg tablet 0 Starting on: 5/16/2017 Sig: Take 2 Tabs by mouth four (4) times daily for 30 days. Max Daily Amount: 80 mg. for chronic, severe, refractory pain. Indications: Chronic Pain, Severe Pain Class: Print Route: Oral  
 tapentadol (NUCYNTA) 100 mg tablet 0 Sig: Take 100 mg by mouth four (4) times daily as needed for Pain. Max Daily Amount: 400 mg. Class: Print Route: Oral  
  
Follow-up Instructions Return in about 1 month (around 2017). Patient Instructions Plan: 
Continue same dose of methadone as prescribed for chronic pain for now We will try Nucynta 100 mg for severe pain flares to see if this better addresses your phantom limb nerve pain Per the [facility] recommendation, we will prescribe Naloxone 4 mg nasal spray to use in case of accidental overdose. Do no use Naloxone for any reasons other than opioid toxicity, as its use may precipitate withdrawals. Stop Norco 
Follow up in 1 months or sooner if needed Regular exercise and attention to emotional health and diet remain the most effective ways to treat chronic pain of all kinds You may contact me with questions or concerns through [address] You are in possession of medication that is no longer part of your active treatment regimen ( NORCO and oxycodone). We strongly advise that you properly dispose of it as quickly as possible to help reduce the chance that others may accidentally take or intentionally misuse the discontinued medication. Please understand that ongoing use or distribution of a discontinued medication containing a controlled substance is a violation of your signed Controlled Substance Consent & Treatment Agreement and will result in dismissal from our practice. Listed below are some options and special instructions to consider when disposing of discontinued, , unwanted, or unused medications: · Drug Encorcement Agency (GENO) authorized collectors cansafely and securely collect and dispose of pharmaceuticals containing controlled substances and other medications. Authorized collection sites may be [employer], hospital or clinic pharmacies, and law enforcement locations.   
 
For GENO-authorized collectors near you, contact the [place] of [address] [address] [facility] at [phone] or visit the following web address: [facility].. [facility].gov/pubdispsearch/spring/main?execution=[email or web address]. GENO-authorized collectors within the local [address] include: 
[address] 
[place]ie, [address] [facility]  [facility] 
C/Ori Delacruz - [place] Marvel Ca[address]la, [address], [address] [facility] [address] [place], [address] 
[address], Phoebe [place] [address], [address] · Medicine take-back programs are another good way to safely dispose of most types of discontinued medications. The GENO periodically hosts National Prescription Drug Take-Back events where collection sites are set up in communities nationwide for safe disposal of prescription drugs. Local law enforcement agencies may also sponsor medication take-back programs. · If unable to reach a medication take-back program or GENO-authorized , you can also follow these simple steps to dispose of medications in the household trash: 
1. Mix medications (do not crush tablets or capsules) with an unappetizing substance such as dirt, kade litter, or used coffee grounds; 2. Place the mixture in a container such as a sealed plastic bag; 
3. Throw the container in your household trash; 
4. Scratch out all personal information on the prescription label of your empty pill bottle or empty medication packaging to make it unreadable, then dispose of the container. Introducing [facility]! Dear Greg Mcdowell: Thank you for requesting a [employer] account. Our records indicate that you already have an active [employer] account. You can access your account anytime at [email or web address] Did you know that you can access your hospital and ER discharge instructions at any time in [employer]? You can also review all of your test results from your hospital stay or ER visit. Additional Information If you have questions, please visit the Frequently Asked Questions section of the [facility] website at [email or web address] Remember, [employer] is NOT to be used for urgent needs. For medical emergencies, dial 911. Now available from your iPhone and Android! Please provide this summary of care documentation to your next provider. Your primary care clinician is listed as Alida Fisher. If you have any questions after today's visit, please call [phone].

## 2017-05-05 ENCOUNTER — TELEPHONE (OUTPATIENT)
Dept: PAIN MANAGEMENT | Age: 67
End: 2017-05-05

## 2017-05-05 NOTE — TELEPHONE ENCOUNTER
Danie Norris was denied by [facility] - must have trial and failure of msir. Has tried oxymorphone, oxycodone/apap, hydromorphone, hydrocodone/apap (this was submitted with pa), but no documentation of trial and failure of msir.

## 2017-05-08 NOTE — TELEPHONE ENCOUNTER
Pt called requesting status of her Nucynta pre auth. Have advised that Luis Brown is calling her company today to update info with them for decision. If not finalized today,  pt is requesting short supply of norco that she was changed from last visit. Will return pt call this afternoon with updates. She expressed understanding.

## 2017-05-10 RX ORDER — HYDROCODONE BITARTRATE AND ACETAMINOPHEN 10; 325 MG/1; MG/1
1 TABLET ORAL
Qty: 40 TAB | Refills: 0 | Status: SHIPPED | OUTPATIENT
Start: 2017-05-10 | End: 2017-06-27

## 2017-05-10 NOTE — TELEPHONE ENCOUNTER
Pt called to relay no news on the Nuycnta pre auth. Will get 10 days of the norco to last until authorization details come through.

## 2017-05-15 DIAGNOSIS — M79.2 NEURALGIA: ICD-10-CM

## 2017-05-15 DIAGNOSIS — G54.6 PHANTOM LIMB PAIN (HCC): Primary | ICD-10-CM

## 2017-05-15 NOTE — TELEPHONE ENCOUNTER
Nucynta 100mg #120/30 was approved by [facility] [employer] - appeal was approved. Pt notified.

## 2017-05-17 ENCOUNTER — TELEPHONE (OUTPATIENT)
Dept: PAIN MANAGEMENT | Age: 67
End: 2017-05-17

## 2017-05-17 NOTE — TELEPHONE ENCOUNTER
Pt called re nucynta - co-pay is $5 - said insurance told we could do a tier exception. Called pt and explained because nucynta is a non-formulary medication and they approved it they will not do a tier exception also. Pt understood - will not be able to get the medication - cannot afford.

## 2017-05-22 NOTE — TELEPHONE ENCOUNTER
Per YV:\"QNF pt know that she will have to stay with her previous breakthrough medication until we can see her for a visit to discuss treatment options. If she wishes to make an earlier appt, she is welcome to do so. If she needs to  a RX, that is fine. Thanks \"    Pt called and has enough of her Kristal 15 to last until her appt. She was concerned about breathing difficulties with the med but it was the cigarettes causing issue and now she has quit.

## 2017-06-01 ENCOUNTER — OFFICE VISIT (OUTPATIENT)
Dept: PAIN MANAGEMENT | Age: 67
End: 2017-06-01

## 2017-06-01 VITALS
SYSTOLIC BLOOD PRESSURE: 98 MMHG | WEIGHT: 158 LBS | HEART RATE: 64 BPM | BODY MASS INDEX: 23.4 KG/M2 | DIASTOLIC BLOOD PRESSURE: 58 MMHG | HEIGHT: 69 IN

## 2017-06-01 DIAGNOSIS — G47.01 INSOMNIA SECONDARY TO CHRONIC PAIN: ICD-10-CM

## 2017-06-01 DIAGNOSIS — G54.6 PHANTOM LIMB PAIN (HCC): Primary | ICD-10-CM

## 2017-06-01 DIAGNOSIS — M79.605 PAIN OF LEFT LOWER EXTREMITY: ICD-10-CM

## 2017-06-01 DIAGNOSIS — Z89.612 STATUS POST ABOVE KNEE AMPUTATION OF LEFT LOWER EXTREMITY: ICD-10-CM

## 2017-06-01 DIAGNOSIS — M79.2 NEURALGIA: ICD-10-CM

## 2017-06-01 DIAGNOSIS — G89.4 CHRONIC PAIN SYNDROME: ICD-10-CM

## 2017-06-01 DIAGNOSIS — Z79.899 ENCOUNTER FOR LONG-TERM (CURRENT) DRUG USE: ICD-10-CM

## 2017-06-01 DIAGNOSIS — Z79.899 ENCOUNTER FOR LONG-TERM (CURRENT) USE OF HIGH-RISK MEDICATION: ICD-10-CM

## 2017-06-01 DIAGNOSIS — K59.03 CONSTIPATION DUE TO PAIN MEDICATION: ICD-10-CM

## 2017-06-01 DIAGNOSIS — G89.29 INSOMNIA SECONDARY TO CHRONIC PAIN: ICD-10-CM

## 2017-06-01 RX ORDER — NALOXONE HYDROCHLORIDE 4 MG/.1ML
4 SPRAY NASAL
Qty: 1 PACKAGE | Refills: 0 | Status: SHIPPED | OUTPATIENT
Start: 2017-06-01

## 2017-06-01 RX ORDER — OXYCODONE HYDROCHLORIDE 15 MG/1
15 TABLET ORAL
Qty: 120 TAB | Refills: 0 | Status: SHIPPED | OUTPATIENT
Start: 2017-06-01 | End: 2017-06-27 | Stop reason: SDUPTHER

## 2017-06-01 NOTE — MR AVS SNAPSHOT
Visit Information Date & Time Provider Department Dept. Phone Encounter #  
 6/1/2017  3:00 PM Sandra Reynaga [facility] [phone] [id number] Follow-up Instructions Return in about 1 month (around 7/1/2017). Follow-up and Disposition History Upcoming Health Maintenance Date Due Hepatitis C Screening 1950 DTaP/Tdap/Td series (1 - Tdap) 10/30/1972 BREAST CANCER SCRN MAMMOGRAM 10/30/2001 FOBT Q 1 YEAR AGE 50-75 10/30/2001 ZOSTER VACCINE AGE 60> 10/30/2011 GLAUCOMA SCREENING Q2Y 10/30/2016 OSTEOPOROSIS SCREENING (DEXA) 10/30/2016 Pneumococcal 65+ Low/Medium Risk (1 of 2 - PCV13) 10/30/2016 MEDICARE YEARLY EXAM 10/30/2016 INFLUENZA AGE 9 TO ADULT 8/1/2017 Allergies as of 6/1/2017  Review Complete On: 6/1/2017 By: Serina Andrade Severity Noted Reaction Type Reactions Heparin Analogues  08/19/2014   Side Effect Other (comments) Blood clotted resulted her to have amputation of the leg Iodinated Contrast Media - Oral And Iv Dye    Hives Current Immunizations  Never Reviewed No immunizations on file. Not reviewed this visit You Were Diagnosed With   
  
 Codes Comments Ph[facility] ([facility]    -  Primary ICD-10-CM: G54.6 ICD-9-CM: 353.6 Neuralgia     ICD-10-CM: M79.2 ICD-9-CM: 729.2 Chronic pain syndrome     ICD-10-CM: G89.4 ICD-9-CM: 338.4 Pain of left lower extremity     ICD-10-CM: M79.605 ICD-9-CM: 729.5 Status post above knee amputation of left lower extremity ([facility]     ICD-10-CM: F37.480 ICD-9-CM: V49.76 Encounter for long-term (current) drug use     ICD-10-CM: Z79.899 ICD-9-CM: V58.69 Insomnia secondary to chronic pain     ICD-10-CM: G89.29, G47.01 
ICD-9-CM: 338.29, 327.01 Constipation due to pain medication     ICD-10-CM: K59.03 
ICD-9-CM: 564.09, E947.9  Encounter for long-term (current) use of high-risk medication ICD-10-CM: E79.799 ICD-9-CM: V58.69 Vitals BP Pulse Height(growth percentile) Weight(growth percentile) BMI OB Status 98/58 64 5' 9\" (1.753 m) 158 lb (71.7 kg) 23.33 kg/m2 Hysterectomy Smoking Status Former Smoker Vitals History BMI and BSA Data Body Mass Index Body Surface Area  
 23.33 kg/m 2 1.87 m 2 Preferred Pharmacy Pharmacy Name Phone Elvira Winchester [address] [place] [address]321 [facility] [address] [place] [address] [phone] Your Updated Medication List  
  
   
This list is accurate as of: 6/1/17  3:05 PM.  Always use your most recent med list.  
  
  
  
  
 albuterol 90 mcg/actuation inhaler Commonly known as:  PROVENTIL HFA, VENTOLIN HFA, PROAIR HFA Take 2 Puffs by inhalation as needed for Wheezing. Indications: asthma  
  
 atorvastatin 80 mg tablet Commonly known as:  LIPITOR Take 80 mg by mouth nightly. Indications: HYPERCHOLESTEROLEMIA  
  
 carvedilol 12.5 mg tablet Commonly known as:  Marco [place] Take 12.5 mg by mouth two (2) times daily (with meals). Indications: HYPERTENSION, CHF  
  
 CORRECTOL 5 mg Tab Generic drug:  bisacodyl Take  by mouth. furosemide 40 mg tablet Commonly known as:  LASIX Take 40 mg by mouth daily as needed. gabapentin 800 mg tablet Commonly known as:  NEURONTIN Take 300 mg by mouth three (3) times daily. Indications: NEUROPATHIC PAIN  
  
 guaiFENesin  mg ER tablet Commonly known as:  Cm & Cm Take 600 mg by mouth two (2) times daily as needed for Congestion. HYDROcodone-acetaminophen  mg tablet Commonly known as:  1463 Elberte Lamonte Take 1 Tab by mouth four (4) times daily as needed for Pain. Max Daily Amount: 4 Tabs. Iron 325 mg (65 mg iron) tablet Generic drug:  ferrous sulfate Take  by mouth two (2) times daily (with meals). LORazepam 1 mg tablet Commonly known as:  ATIVAN Take 0.5 mg by mouth nightly as needed for Anxiety. losartan 25 mg tablet Commonly known as:  COZAAR Take 25 mg by mouth daily. Indications: HYPERTENSION  
  
 methadone 10 mg tablet Commonly known as:  DOLOPHINE Take 2 Tabs by mouth four (4) times daily for 30 days. Max Daily Amount: 80 mg. for chronic, severe, refractory pain. Indications: Chronic Pain, Severe Pain  
  
 naloxone 4 mg/actuation Spry 4 mg by Nasal route once as needed (opioid overdose) for up to 1 dose. May substitute 2 mg syringe if insurance requires  
  
 omeprazole 20 mg capsule Commonly known as:  PRILOSEC Take 20 mg by mouth daily. Indications: GASTROESOPHAGEAL REFLUX  
  
 oxyCODONE IR 15 mg immediate release tablet Commonly known as:  Carito Beath Take 1 Tab by mouth four (4) times daily as needed for Pain for up to 30 days. Max Daily Amount: 4 Tabs. May take 1/2 to 1 tab QID prn breakthrough pain  Indications: NEUROPATHIC PAIN, Pain  
  
 polyvinyl alcohol 1.4 % ophthalmic solution Commonly known as:  Shirbhavik Specter Administer 1 Drop to both eyes as needed. potassium chloride 20 mEq packet Commonly known as:  KLOR-CON Take 20 mEq by mouth two (2) times a day. Indications: HYPOKALEMIA PREVENTION  
  
 senna-docusate 8.6-50 mg per tablet Commonly known as:  Areatha Dach Take 1 Tab by mouth as needed for Constipation. Alternates with Docusate sodium. spironolactone 25 mg tablet Commonly known as:  ALDACTONE Take 25 mg by mouth daily. Indications: EDEMA  
  
 tiotropium 18 mcg inhalation capsule Commonly known as:  Nessa Dry Take 1 Cap by inhalation two (2) times a day. venlafaxine- mg capsule Commonly known as:  EFFEXOR-XR  
TAKE 1 CAPSULE BY MOUTH DAILY FOR 30 DAYS  
  
 XARELTO 10 mg tablet Generic drug:  rivaroxaban Take  by mouth daily. Prescriptions Printed Refills  
 oxyCODONE IR (ROXICODONE) 15 mg immediate release tablet 0  Sig: Take 1 Tab by mouth four (4) times daily as needed for Pain for up to 30 days. Max Daily Amount: 4 Tabs. May take 1/2 to 1 tab QID prn breakthrough pain  Indications: NEUROPATHIC PAIN, Pain Class: Print Route: Oral  
  
Prescriptions Sent to Pharmacy Refills  
 naloxone 4 mg/actuation spry 0 Si mg by Nasal route once as needed (opioid overdose) for up to 1 dose. May substitute 2 mg syringe if insurance requires Class: Normal  
 Pharmacy: [address] [place] [address], [address] [place] [address] #: [phone] Route: Nasal  
  
Follow-up Instructions Return in about 1 month (around 2017). Patient Instructions Plan: 
Continue same dose of methadone as prescribed for chronic pain--take on a schedule! change back to Roxicodone 15 mg for breakthrough pain. Start with 1/2 tablet and increase to 1 tablet up to four times daily as needed Per the [place] of Medicine recommendation, we will prescribe Naloxone 4 mg nasal spray to use in case of accidental overdose. Do no use Naloxone for any reasons other than opioid toxicity, as its use may precipitate withdrawals. Look into Levorphanol 2 mg for chronic, severe pain. See if this is a well-covered medication with your insurance plan Follow up in 1 months or sooner if needed Regular exercise and attention to emotional health and diet remain the most effective ways to treat chronic pain of all kinds You may contact me with questions or concerns through [address] [facility] SERVICES! Dear Krysta Pacheco: Thank you for requesting a Bureaux [employer] Partager account. Our records indicate that you already have an active [employer]ager account. You can access your account anytime at [email or web address] Did you know that you can access your hospital and ER discharge instructions at any time in Bure[employer] Partager? You can also review all of your test results from your hospital stay or ER visit. Additional Information If you have questions, please visit the Frequently Asked Questions section of the Almondyhart website at [email or web address] Remember, PerceptiMed is NOT to be used for urgent needs. For medical emergencies, dial 911. Now available from your iPhone and Android! Please provide this summary of care documentation to your next provider. Your primary care clinician is listed as Skip Burgos. If you have any questions after today's visit, please call [phone].

## 2017-06-01 NOTE — PROGRESS NOTES
HISTORY OF PRESENT ILLNESS  Parvin Souza is a 73 y.o. female. She returns for followup of chronic, severe pain which includes phantom pain status post left AKA as well as chronic bilateral leg pain due to severe PAD. She reports that she was not able to have Nucynta 100 mg filled due to extremely high out of pocket costs (>$500), so she was forced to go back to norco, which is only marginally beneficial for pain. She has been hamstrung either by severe side effects with many medications or inefficacy or cost. We discussed that our options for addressing pain pharmacologically are slim. We discussed options at length, and she ultimately decides to go back to oxycodone IR, noting that SOB symptoms will not likely recur as she has quit smoking. She reports that wheezing and dyspnea have resolved since quitting smoking. We discussed the signs and symptoms of potential overdose and will prescribe Narcan to use in an emergency. Her  will monitor closely for side effects. She was also counseled to bring methadone to every visit, and also to remember to take it regularly. She admits that she is  A total of 40 minutes was spent with the patient of which more than 50% of the time was spent counseling the patient. HPI--see above    ROS  Constitutional: Negative for weight loss. Respiratory: Negative for shortness of breath. Gastrointestinal: Negative for constipation. Musculoskeletal: Positive for myalgias, back pain, joint pain and neck pain. Negative for falls. Skin: Negative for rash. Neurological: Positive for sensory change. Memory loss   Physical Exam  Constitutional: She is oriented to person, place, and time. She appears well-developed and well-nourished. She is cooperative. No distress. HENT:   Head: Normocephalic and atraumatic. Pulmonary/Chest: Effort normal. No respiratory distress. Musculoskeletal:        Right upper leg: She exhibits deformity (AKA).    Neurological: She is alert and oriented to person, place, and time. Gait abnormal.   Skin: Skin is warm and dry. She is not diaphoretic. Psychiatric: She has a normal mood and affect. Her speech is normal and behavior is normal. Thought content normal.   ASSESSMENT and PLAN    ICD-10-CM ICD-9-CM    1. Phantom limb pain (HCC) G54.6 353.6    2. Neuralgia M79.2 729.2    3. Chronic pain syndrome G89.4 338.4    4. Pain of left lower extremity M79.605 729.5    5. Status post above knee amputation of left lower extremity ([facility] Z89.612 V49.76    6. Encounter for long-term (current) drug use Z79.899 V58.69    7. Insomnia secondary to chronic pain G89.29 338.29     G47.01 327.01    8. Constipation due to pain medication K59.03 564.09      E947.9    9. Encounter for long-term (current) use of high-risk medication Z79.899 V58.69       Plan:  Continue same dose of methadone as prescribed for chronic pain--take on a schedule! change back to Roxicodone 15 mg for breakthrough pain. Start with 1/2 tablet and increase to 1 tablet up to four times daily as needed  Per the [address] of Medicine recommendation, we will prescribe Naloxone 4 mg nasal spray to use in case of accidental overdose. Do no use Naloxone for any reasons other than opioid toxicity, as its use may precipitate withdrawals. Look into Levorphanol 2 mg for chronic, severe pain.  See if this is a well-covered medication with your insurance plan  Follow up in 1 months or sooner if needed  Regular exercise and attention to emotional health and diet remain the most effective ways to treat chronic pain of all kinds  You may contact me with questions or concerns through [address]

## 2017-06-01 NOTE — PROGRESS NOTES
Nursing Notes    Patient presents to the office today in follow-up. Patient rates her pain at 8/10 on the numerical pain scale. Reviewed medications with counts as follows:    Rx Date filled Qty Dispensed Pill Count Last Dose Short   norco 10/325mg 05/11/17 40 0 ? no                                          Comments: Pt states she had some oxycodone at home that she take for a few day until her appt. Bottle was not brought in. She stated this was an old prescription from Dr. Chela Portillo. POC UDS was not performed in office today    Any new labs or imaging since last appointment? YES,pcp    Have you been to an emergency room (ER) or urgent care clinic since your last visit? NO            Have you been hospitalized since your last visit? NO     If yes, where, when, and reason for visit? Have you seen or consulted any other health care providers outside of the [address] [place] [address]  since your last visit? YES, PCP     If yes, where, when, and reason for visit? HM deferred to pcp.

## 2017-06-01 NOTE — PATIENT INSTRUCTIONS
Plan:  Continue same dose of methadone as prescribed for chronic pain--take on a schedule! change back to Roxicodone 15 mg for breakthrough pain. Start with 1/2 tablet and increase to 1 tablet up to four times daily as needed  Per the [facility] recommendation, we will prescribe Naloxone 4 mg nasal spray to use in case of accidental overdose. Do no use Naloxone for any reasons other than opioid toxicity, as its use may precipitate withdrawals. Look into Levorphanol 2 mg for chronic, severe pain.  See if this is a well-covered medication with your insurance plan  Follow up in 1 months or sooner if needed  Regular exercise and attention to emotional health and diet remain the most effective ways to treat chronic pain of all kinds  You may contact me with questions or concerns through [address]

## 2017-06-27 ENCOUNTER — OFFICE VISIT (OUTPATIENT)
Dept: PAIN MANAGEMENT | Age: 67
End: 2017-06-27

## 2017-06-27 VITALS
HEART RATE: 61 BPM | SYSTOLIC BLOOD PRESSURE: 114 MMHG | DIASTOLIC BLOOD PRESSURE: 58 MMHG | HEIGHT: 69 IN | BODY MASS INDEX: 23.4 KG/M2 | WEIGHT: 158 LBS

## 2017-06-27 DIAGNOSIS — Z89.612 STATUS POST ABOVE KNEE AMPUTATION OF LEFT LOWER EXTREMITY: ICD-10-CM

## 2017-06-27 DIAGNOSIS — R51.9 CHRONIC INTRACTABLE HEADACHE, UNSPECIFIED HEADACHE TYPE: ICD-10-CM

## 2017-06-27 DIAGNOSIS — Z79.899 ENCOUNTER FOR LONG-TERM (CURRENT) USE OF HIGH-RISK MEDICATION: ICD-10-CM

## 2017-06-27 DIAGNOSIS — G54.6 PHANTOM LIMB PAIN (HCC): Primary | ICD-10-CM

## 2017-06-27 DIAGNOSIS — G89.29 CHRONIC INTRACTABLE HEADACHE, UNSPECIFIED HEADACHE TYPE: ICD-10-CM

## 2017-06-27 DIAGNOSIS — M79.605 PAIN OF LEFT LOWER EXTREMITY: ICD-10-CM

## 2017-06-27 DIAGNOSIS — Z79.899 ENCOUNTER FOR LONG-TERM (CURRENT) DRUG USE: ICD-10-CM

## 2017-06-27 DIAGNOSIS — M79.2 NEURALGIA: ICD-10-CM

## 2017-06-27 RX ORDER — OXYCODONE HYDROCHLORIDE 15 MG/1
15 TABLET ORAL
Qty: 120 TAB | Refills: 0 | Status: SHIPPED | OUTPATIENT
Start: 2017-08-29 | End: 2017-09-21 | Stop reason: SDUPTHER

## 2017-06-27 RX ORDER — OXYCODONE HYDROCHLORIDE 15 MG/1
15 TABLET ORAL
Qty: 120 TAB | Refills: 0 | Status: SHIPPED | OUTPATIENT
Start: 2017-07-31 | End: 2017-06-27 | Stop reason: SDUPTHER

## 2017-06-27 RX ORDER — METHADONE HYDROCHLORIDE 10 MG/1
20 TABLET ORAL 4 TIMES DAILY
Qty: 240 TAB | Refills: 0 | Status: SHIPPED | OUTPATIENT
Start: 2017-07-02 | End: 2017-06-27 | Stop reason: SDUPTHER

## 2017-06-27 RX ORDER — OXYCODONE HYDROCHLORIDE 15 MG/1
15 TABLET ORAL
Qty: 120 TAB | Refills: 0 | Status: SHIPPED | OUTPATIENT
Start: 2017-07-02 | End: 2017-06-27 | Stop reason: SDUPTHER

## 2017-06-27 RX ORDER — METHADONE HYDROCHLORIDE 10 MG/1
20 TABLET ORAL 4 TIMES DAILY
Qty: 240 TAB | Refills: 0 | Status: SHIPPED | OUTPATIENT
Start: 2017-08-29 | End: 2017-09-21 | Stop reason: SDUPTHER

## 2017-06-27 RX ORDER — METHADONE HYDROCHLORIDE 10 MG/1
20 TABLET ORAL 4 TIMES DAILY
Qty: 240 TAB | Refills: 0 | Status: SHIPPED | OUTPATIENT
Start: 2017-07-31 | End: 2017-06-27 | Stop reason: SDUPTHER

## 2017-06-27 NOTE — PROGRESS NOTES
HISTORY OF PRESENT ILLNESS  Abdiel Simon is a 73 y.o. female. She returns for followup of chronic, severe pain which includes phantom pain status post left AKA as well as chronic bilateral leg pain due to severe PAD. As usual, she states, \"I'm doing terrible\". She recently received three units of PRBCs to address profound anemia, and this only brought her hemoglobin to 8.3. Therefore, she still has not been able to have the cardiac cath performed to addressed her occluded cardiac artery. She has in-dwelling permanent uretal stents that need to be replaced, as these are likely causing her chronic blood loss, but she cannot have this procedure performed until her CHD is addressed via cardiac catheterization. It is unclear when these issues are going to be resolved, as each serious health condition precludes treatment for the others. These setbacks have been incredibly frustrated for her and her . And until these issues are addressed, she is not a candidate for alternative interventions such as neuromodulation. We discussed SCS (HF-10 versus Spectra) to address phantom limb pain as well as chronic low back pain. She would chavo to consider this and will research this on her own. The change to roxicodone 15 mg has been well tolerated and more effective for pain than percocet. However, she continues to endorse fairly high pain levels most days. We discussed that our expectations for medication management is 50% pain reduction, and she endorses 60-70% relief with her current regimen. This would be considered successful treatment. Expectation management was discussed. She denies new or worsening insomnia or constipation issues. She denies any falls, injuries, or hospitalizations since the last visit. A total of 40 minutes was spent with the patient of which more than 50% of the time was spent counseling the patient. HPI --see above    ROS  Constitutional: Negative for weight loss.    Respiratory: Negative for shortness of breath. Gastrointestinal: Negative for constipation. Musculoskeletal: Positive for myalgias, back pain, joint pain and neck pain. Negative for falls. Skin: Negative for rash. Neurological: Positive for sensory change. Memory loss   Physical Exam  Constitutional: She is oriented to person, place, and time. She appears well-developed and well-nourished. She is cooperative. No distress. HENT:   Head: Normocephalic and atraumatic. Pulmonary/Chest: Effort normal. No respiratory distress. Musculoskeletal:        Right upper leg: She exhibits deformity (AKA). Neurological: She is alert and oriented to person, place, and time. Gait abnormal.   Skin: Skin is warm and dry. She is not diaphoretic. Psychiatric: She has a normal mood and affect. Her speech is normal and behavior is normal. Thought content normal.   ASSESSMENT and PLAN    ICD-10-CM ICD-9-CM    1. Phantom limb pain (HCC) G54.6 353.6    2. Neuralgia M79.2 729.2    3. Chronic intractable headache, unspecified headache type R51 784.0    4. Pain of left lower extremity M79.605 729.5    5. Status post above knee amputation of left lower extremity ([facility] Z89.612 V49.76    6. Encounter for long-term (current) drug use Z79.899 V58.69    7.  Encounter for long-term (current) use of high-risk medication Z79.899 V58.69    Plan:  Continue same medications as prescribed for chronic pain  Follow up in 3 months or sooner if needed  Regular exercise and attention to emotional health and diet remain the most effective ways to treat chronic pain of all kinds  You may contact me with questions or concerns through [address]

## 2017-06-27 NOTE — MR AVS SNAPSHOT
Visit Information Date & Time Provider Department Dept. Phone Encounter #  
 6/27/2017 12:40 PM Kami Grace [facility]erson [facility] [address] for Pain Management [phone] [id number] Upcoming Health Maintenance Date Due Hepatitis C Screening 1950 DTaP/Tdap/Td series (1 - Tdap) 10/30/1972 BREAST CANCER SCRN MAMMOGRAM 10/30/2001 FOBT Q 1 YEAR AGE 50-75 10/30/2001 ZOSTER VACCINE AGE 60> 10/30/2011 GLAUCOMA SCREENING Q2Y 10/30/2016 OSTEOPOROSIS SCREENING (DEXA) 10/30/2016 Pneumococcal 65+ Low/Medium Risk (1 of 2 - PCV13) 10/30/2016 MEDICARE YEARLY EXAM 10/30/2016 INFLUENZA AGE 9 TO ADULT 8/1/2017 Allergies as of 6/27/2017  Review Complete On: 6/27/2017 By: Rafael Chapman Severity Noted Reaction Type Reactions Heparin Analogues  08/19/2014   Side Effect Other (comments) Blood clotted resulted her to have amputation of the leg Iodinated Contrast- Oral And Iv Dye    Hives Current Immunizations  Never Reviewed No immunizations on file. Not reviewed this visit You Were Diagnosed With   
  
 Codes Comments [facility]    -  Primary ICD-10-CM: G54.6 ICD-9-CM: 353.6 Neuralgia     ICD-10-CM: M79.2 ICD-9-CM: 729.2 Chronic intractable headache, unspecified headache type     ICD-10-CM: R51 ICD-9-CM: 784.0 Pain of left lower extremity     ICD-10-CM: M79.605 ICD-9-CM: 729.5 Status post above knee amputation of left lower extremity ([facility]     ICD-10-CM: X67.709 ICD-9-CM: V49.76 Encounter for long-term (current) drug use     ICD-10-CM: Z79.899 ICD-9-CM: V58.69 Encounter for long-term (current) use of high-risk medication     ICD-10-CM: Z79.899 ICD-9-CM: V58.69 Vitals BP Pulse Height(growth percentile) Weight(growth percentile) BMI OB Status 114/58 61 5' 9\" (1.753 m) 158 lb (71.7 kg) 23.33 kg/m2 Hysterectomy Smoking Status Former Smoker BMI and BSA Data Body Mass Index Body Surface Area  
 23.33 kg/m 2 1.87 m 2 Preferred Pharmacy Pharmacy Name Phone Elvira Winchester [address] [place], [place] [address] [address] [phone] Your Updated Medication List  
  
   
This list is accurate as of: 6/27/17  1:56 PM.  Always use your most recent med list.  
  
  
  
  
 albuterol 90 mcg/actuation inhaler Commonly known as:  PROVENTIL HFA, VENTOLIN HFA, PROAIR HFA Take 2 Puffs by inhalation as needed for Wheezing. Indications: asthma  
  
 atorvastatin 80 mg tablet Commonly known as:  LIPITOR Take 80 mg by mouth nightly. Indications: HYPERCHOLESTEROLEMIA  
  
 carvedilol 12.5 mg tablet Commonly known as:  Efrem Reef Take 12.5 mg by mouth two (2) times daily (with meals). Indications: HYPERTENSION, CHF  
  
 CORRECTOL 5 mg Tab Generic drug:  bisacodyl Take  by mouth. furosemide 40 mg tablet Commonly known as:  LASIX Take 40 mg by mouth daily as needed. gabapentin 800 mg tablet Commonly known as:  NEURONTIN Take 300 mg by mouth three (3) times daily. Indications: NEUROPATHIC PAIN  
  
 guaiFENesin  mg ER tablet Commonly known as:  Cm & Cm Take 600 mg by mouth two (2) times daily as needed for Congestion. Iron 325 mg (65 mg iron) tablet Generic drug:  ferrous sulfate Take  by mouth two (2) times daily (with meals). LORazepam 1 mg tablet Commonly known as:  ATIVAN Take 0.5 mg by mouth nightly as needed for Anxiety. losartan 25 mg tablet Commonly known as:  COZAAR Take 25 mg by mouth daily. Indications: HYPERTENSION  
  
 methadone 10 mg tablet Commonly known as:  DOLOPHINE Take 2 Tabs by mouth four (4) times daily for 30 days. Max Daily Amount: 80 mg. for chronic, severe, refractory pain. Indications: Chronic Pain, Severe Pain Start taking on:  8/29/2017  
  
 naloxone 4 mg/actuation Spry 4 mg by Nasal route once as needed (opioid overdose) for up to 1 dose. May substitute 2 mg syringe if insurance requires  
  
 omeprazole 20 mg capsule Commonly known as:  PRILOSEC Take 20 mg by mouth daily. Indications: GASTROESOPHAGEAL REFLUX  
  
 oxyCODONE IR 15 mg immediate release tablet Commonly known as:  Avani Hails Take 1 Tab by mouth four (4) times daily as needed for Pain for up to 30 days. Max Daily Amount: 4 Tabs. May take 1/2 to 1 tab QID prn breakthrough pain  Indications: NEUROPATHIC PAIN, Pain Start taking on:  8/29/2017 polyvinyl alcohol 1.4 % ophthalmic solution Commonly known as:  KinWallerius Administer 1 Drop to both eyes as needed. potassium chloride 20 mEq packet Commonly known as:  KLOR-CON Take 20 mEq by mouth two (2) times a day. Indications: HYPOKALEMIA PREVENTION  
  
 senna-docusate 8.6-50 mg per tablet Commonly known as:  Clenton Plenty Take 1 Tab by mouth as needed for Constipation. Alternates with Docusate sodium. spironolactone 25 mg tablet Commonly known as:  ALDACTONE Take 25 mg by mouth daily. Indications: EDEMA  
  
 tiotropium 18 mcg inhalation capsule Commonly known as:  Leopold Bathe Take 1 Cap by inhalation two (2) times a day. venlafaxine- mg capsule Commonly known as:  EFFEXOR-XR  
TAKE 1 CAPSULE BY MOUTH DAILY FOR 30 DAYS  
  
 XARELTO 10 mg tablet Generic drug:  rivaroxaban Take  by mouth daily. Prescriptions Printed Refills  
 oxyCODONE IR (ROXICODONE) 15 mg immediate release tablet 0 Starting on: 8/29/2017 Sig: Take 1 Tab by mouth four (4) times daily as needed for Pain for up to 30 days. Max Daily Amount: 4 Tabs. May take 1/2 to 1 tab QID prn breakthrough pain  Indications: NEUROPATHIC PAIN, Pain Class: Print Route: Oral  
 methadone (DOLOPHINE) 10 mg tablet 0 Starting on: 8/29/2017 Sig: Take 2 Tabs by mouth four (4) times daily for 30 days.  Max Daily Amount: 80 mg. for chronic, severe, refractory pain. Indications: Chronic Pain, Severe Pain Class: Print Route: Oral  
  
Introducing [facility] SERVICES! Dear Awanda Duane: Thank you for requesting a Pensqr account. Our records indicate that you already have an active Pensqr account. You can access your account anytime at [email or web address] Did you know that you can access your hospital and ER discharge instructions at any time in Pensqr? You can also review all of your test results from your hospital stay or ER visit. Additional Information If you have questions, please visit the Frequently Asked Questions section of the Pensqr website at [email or web address] Remember, Pensqr is NOT to be used for urgent needs. For medical emergencies, dial 911. Now available from your iPhone and Android! Please provide this summary of care documentation to your next provider. Your primary care clinician is listed as Josesito Almanzar. If you have any questions after today's visit, please call [phone].

## 2017-07-12 ENCOUNTER — HOSPITAL ENCOUNTER (OUTPATIENT)
Dept: PREADMISSION TESTING | Age: 67
Discharge: HOME | End: 2017-07-12
Attending: UROLOGY
Payer: MEDICARE

## 2017-07-12 LAB
ANION GAP BLD CALC-SCNC: 5 MMOL/L (ref 3–18)
BASOPHILS # BLD AUTO: 0.1 K/UL (ref 0–0.06)
BASOPHILS # BLD: 1 % (ref 0–2)
BUN SERPL-MCNC: 22 MG/DL (ref 7–18)
BUN/CREAT SERPL: 13 (ref 12–20)
CALCIUM SERPL-MCNC: 7.9 MG/DL (ref 8.5–10.1)
CHLORIDE SERPL-SCNC: 104 MMOL/L (ref 100–108)
CO2 SERPL-SCNC: 31 MMOL/L (ref 21–32)
CREAT SERPL-MCNC: 1.67 MG/DL (ref 0.6–1.3)
DIFFERENTIAL METHOD BLD: ABNORMAL
EOSINOPHIL # BLD: 0.2 K/UL (ref 0–0.4)
EOSINOPHIL NFR BLD: 3 % (ref 0–5)
ERYTHROCYTE [DISTWIDTH] IN BLOOD BY AUTOMATED COUNT: 14.7 % (ref 11.6–14.5)
GLUCOSE SERPL-MCNC: 106 MG/DL (ref 74–99)
HCT VFR BLD AUTO: 32.2 % (ref 35–45)
HGB BLD-MCNC: 10.3 G/DL (ref 12–16)
LYMPHOCYTES # BLD AUTO: 30 % (ref 21–52)
LYMPHOCYTES # BLD: 1.6 K/UL (ref 0.9–3.6)
MCH RBC QN AUTO: 29.9 PG (ref 24–34)
MCHC RBC AUTO-ENTMCNC: 32 G/DL (ref 31–37)
MCV RBC AUTO: 93.6 FL (ref 74–97)
MONOCYTES # BLD: 0.5 K/UL (ref 0.05–1.2)
MONOCYTES NFR BLD AUTO: 10 % (ref 3–10)
NEUTS SEG # BLD: 2.9 K/UL (ref 1.8–8)
NEUTS SEG NFR BLD AUTO: 56 % (ref 40–73)
PLATELET # BLD AUTO: 190 K/UL (ref 135–420)
PMV BLD AUTO: 9.9 FL (ref 9.2–11.8)
POTASSIUM SERPL-SCNC: 4.3 MMOL/L (ref 3.5–5.5)
RBC # BLD AUTO: 3.44 M/UL (ref 4.2–5.3)
SODIUM SERPL-SCNC: 140 MMOL/L (ref 136–145)
WBC # BLD AUTO: 5.3 K/UL (ref 4.6–13.2)

## 2017-07-12 PROCEDURE — 85025 COMPLETE CBC W/AUTO DIFF WBC: CPT | Performed by: UROLOGY

## 2017-07-12 PROCEDURE — 80048 BASIC METABOLIC PNL TOTAL CA: CPT | Performed by: UROLOGY

## 2017-07-12 PROCEDURE — 36415 COLL VENOUS BLD VENIPUNCTURE: CPT | Performed by: UROLOGY

## 2017-07-13 RX ORDER — FENTANYL CITRATE 50 UG/ML
25 INJECTION, SOLUTION INTRAMUSCULAR; INTRAVENOUS AS NEEDED
Status: CANCELLED | OUTPATIENT
Start: 2017-07-13

## 2017-07-13 RX ORDER — SODIUM CHLORIDE, SODIUM LACTATE, POTASSIUM CHLORIDE, CALCIUM CHLORIDE 600; 310; 30; 20 MG/100ML; MG/100ML; MG/100ML; MG/100ML
1000 INJECTION, SOLUTION INTRAVENOUS CONTINUOUS
Status: CANCELLED | OUTPATIENT
Start: 2017-07-13

## 2017-07-13 RX ORDER — ALBUTEROL SULFATE 0.83 MG/ML
2.5 SOLUTION RESPIRATORY (INHALATION) AS NEEDED
Status: CANCELLED | OUTPATIENT
Start: 2017-07-13

## 2017-07-13 RX ORDER — MAGNESIUM SULFATE 100 %
4 CRYSTALS MISCELLANEOUS AS NEEDED
Status: CANCELLED | OUTPATIENT
Start: 2017-07-13

## 2017-07-13 RX ORDER — FLUMAZENIL 0.1 MG/ML
0.2 INJECTION INTRAVENOUS
Status: CANCELLED | OUTPATIENT
Start: 2017-07-13

## 2017-07-13 RX ORDER — DIPHENHYDRAMINE HYDROCHLORIDE 50 MG/ML
12.5 INJECTION, SOLUTION INTRAMUSCULAR; INTRAVENOUS
Status: CANCELLED | OUTPATIENT
Start: 2017-07-13

## 2017-07-13 RX ORDER — SODIUM CHLORIDE 0.9 % (FLUSH) 0.9 %
5-10 SYRINGE (ML) INJECTION AS NEEDED
Status: CANCELLED | OUTPATIENT
Start: 2017-07-13

## 2017-07-13 RX ORDER — HYDROMORPHONE HYDROCHLORIDE 2 MG/ML
0.5 INJECTION, SOLUTION INTRAMUSCULAR; INTRAVENOUS; SUBCUTANEOUS
Status: CANCELLED | OUTPATIENT
Start: 2017-07-13

## 2017-07-13 RX ORDER — NALOXONE HYDROCHLORIDE 0.4 MG/ML
0.2 INJECTION, SOLUTION INTRAMUSCULAR; INTRAVENOUS; SUBCUTANEOUS AS NEEDED
Status: CANCELLED | OUTPATIENT
Start: 2017-07-13

## 2017-07-13 RX ORDER — DEXTROSE 50 % IN WATER (D50W) INTRAVENOUS SYRINGE
25-50 AS NEEDED
Status: CANCELLED | OUTPATIENT
Start: 2017-07-13

## 2017-07-14 ENCOUNTER — APPOINTMENT (OUTPATIENT)
Dept: GENERAL RADIOLOGY | Age: 67
End: 2017-07-14
Attending: UROLOGY
Payer: MEDICARE

## 2017-07-14 ENCOUNTER — HOSPITAL ENCOUNTER (OUTPATIENT)
Age: 67
Setting detail: OUTPATIENT SURGERY
Discharge: HOME | End: 2017-07-14
Attending: UROLOGY | Admitting: UROLOGY
Payer: MEDICARE

## 2017-07-14 VITALS
RESPIRATION RATE: 18 BRPM | HEART RATE: 57 BPM | DIASTOLIC BLOOD PRESSURE: 46 MMHG | WEIGHT: 161.44 LBS | SYSTOLIC BLOOD PRESSURE: 95 MMHG | BODY MASS INDEX: 23.91 KG/M2 | HEIGHT: 69 IN | TEMPERATURE: 97.9 F | OXYGEN SATURATION: 97 %

## 2017-07-14 PROCEDURE — 96360 HYDRATION IV INFUSION INIT: CPT

## 2017-07-14 PROCEDURE — 74011250636 HC RX REV CODE- 250/636: Performed by: UROLOGY

## 2017-07-14 RX ORDER — SODIUM CHLORIDE, SODIUM LACTATE, POTASSIUM CHLORIDE, CALCIUM CHLORIDE 600; 310; 30; 20 MG/100ML; MG/100ML; MG/100ML; MG/100ML
125 INJECTION, SOLUTION INTRAVENOUS CONTINUOUS
Status: DISCONTINUED | OUTPATIENT
Start: 2017-07-14 | End: 2017-07-14 | Stop reason: HOSPADM

## 2017-07-14 RX ORDER — CEFAZOLIN SODIUM 2 G/50ML
2 SOLUTION INTRAVENOUS ONCE
Status: DISCONTINUED | OUTPATIENT
Start: 2017-07-14 | End: 2017-07-14 | Stop reason: HOSPADM

## 2017-07-14 RX ADMIN — SODIUM CHLORIDE, SODIUM LACTATE, POTASSIUM CHLORIDE, AND CALCIUM CHLORIDE 125 ML/HR: 600; 310; 30; 20 INJECTION, SOLUTION INTRAVENOUS at 06:39

## 2017-07-14 NOTE — PERIOP NOTES
Pt is aware of plan of care regarding cancellation.  She stated that she is to return Tues at 0530 per  instructions

## 2017-07-14 NOTE — PROGRESS NOTES
conducted a pre-surgery visit with Frankie Rondon, who is a 73 y.o.,female. The  provided the following Interventions:  Initiated a relationship of care and support. Offered prayer and assurance of continued prayers on patient's behalf. Plan:  Chaplains will continue to follow and will provide pastoral care on an as needed/requested basis.  recommends bedside caregivers page  on duty if patient shows signs of acute spiritual or emotional distress.     [address] [facility],[address], [address] [facility] office  [phone] pager

## 2017-07-18 ENCOUNTER — HOSPITAL ENCOUNTER (OUTPATIENT)
Age: 67
Setting detail: OUTPATIENT SURGERY
Discharge: HOME | End: 2017-07-18
Attending: UROLOGY | Admitting: UROLOGY
Payer: MEDICARE

## 2017-07-18 ENCOUNTER — ANESTHESIA (OUTPATIENT)
Dept: SURGERY | Age: 67
End: 2017-07-18
Payer: MEDICARE

## 2017-07-18 ENCOUNTER — ANESTHESIA EVENT (OUTPATIENT)
Dept: SURGERY | Age: 67
End: 2017-07-18
Payer: MEDICARE

## 2017-07-18 ENCOUNTER — APPOINTMENT (OUTPATIENT)
Dept: GENERAL RADIOLOGY | Age: 67
End: 2017-07-18
Attending: UROLOGY
Payer: MEDICARE

## 2017-07-18 VITALS
HEIGHT: 69 IN | SYSTOLIC BLOOD PRESSURE: 124 MMHG | WEIGHT: 162.4 LBS | BODY MASS INDEX: 24.05 KG/M2 | OXYGEN SATURATION: 95 % | RESPIRATION RATE: 16 BRPM | HEART RATE: 64 BPM | TEMPERATURE: 97.7 F | DIASTOLIC BLOOD PRESSURE: 49 MMHG

## 2017-07-18 PROBLEM — R31.0 GROSS HEMATURIA: Status: ACTIVE | Noted: 2017-07-18

## 2017-07-18 PROCEDURE — 77030020269 HC MISC IMPL: Performed by: UROLOGY

## 2017-07-18 PROCEDURE — 74011250636 HC RX REV CODE- 250/636: Performed by: UROLOGY

## 2017-07-18 PROCEDURE — 76210000063 HC OR PH I REC FIRST 0.5 HR: Performed by: UROLOGY

## 2017-07-18 PROCEDURE — C1876 STENT, NON-COA/NON-COV W/DEL: HCPCS | Performed by: UROLOGY

## 2017-07-18 PROCEDURE — 74011250636 HC RX REV CODE- 250/636: Performed by: SPECIALIST

## 2017-07-18 PROCEDURE — 74011000250 HC RX REV CODE- 250

## 2017-07-18 PROCEDURE — 74011250636 HC RX REV CODE- 250/636

## 2017-07-18 PROCEDURE — C1769 GUIDE WIRE: HCPCS | Performed by: UROLOGY

## 2017-07-18 PROCEDURE — 77030018836 HC SOL IRR NACL ICUM -A: Performed by: UROLOGY

## 2017-07-18 PROCEDURE — 74011636320 HC RX REV CODE- 636/320: Performed by: UROLOGY

## 2017-07-18 PROCEDURE — 76210000026 HC REC RM PH II 1 TO 1.5 HR: Performed by: UROLOGY

## 2017-07-18 PROCEDURE — 76010000138 HC OR TIME 0.5 TO 1 HR: Performed by: UROLOGY

## 2017-07-18 PROCEDURE — 77030018846 HC SOL IRR STRL H20 ICUM -A: Performed by: UROLOGY

## 2017-07-18 PROCEDURE — 74420 UROGRAPHY RTRGR +-KUB: CPT

## 2017-07-18 PROCEDURE — 77030020782 HC GWN BAIR PAWS FLX 3M -B: Performed by: UROLOGY

## 2017-07-18 PROCEDURE — 77030012508 HC MSK AIRWY LMA AMBU -A: Performed by: SPECIALIST

## 2017-07-18 PROCEDURE — 76060000032 HC ANESTHESIA 0.5 TO 1 HR: Performed by: UROLOGY

## 2017-07-18 RX ORDER — ONDANSETRON 2 MG/ML
INJECTION INTRAMUSCULAR; INTRAVENOUS AS NEEDED
Status: DISCONTINUED | OUTPATIENT
Start: 2017-07-18 | End: 2017-07-18 | Stop reason: HOSPADM

## 2017-07-18 RX ORDER — DEXAMETHASONE SODIUM PHOSPHATE 4 MG/ML
INJECTION, SOLUTION INTRA-ARTICULAR; INTRALESIONAL; INTRAMUSCULAR; INTRAVENOUS; SOFT TISSUE AS NEEDED
Status: DISCONTINUED | OUTPATIENT
Start: 2017-07-18 | End: 2017-07-18 | Stop reason: HOSPADM

## 2017-07-18 RX ORDER — PROPOFOL 10 MG/ML
INJECTION, EMULSION INTRAVENOUS AS NEEDED
Status: DISCONTINUED | OUTPATIENT
Start: 2017-07-18 | End: 2017-07-18 | Stop reason: HOSPADM

## 2017-07-18 RX ORDER — CEPHALEXIN 500 MG/1
500 CAPSULE ORAL 3 TIMES DAILY
Qty: 15 CAP | Refills: 0 | Status: SHIPPED | OUTPATIENT
Start: 2017-07-18 | End: 2018-01-01

## 2017-07-18 RX ORDER — OXYCODONE AND ACETAMINOPHEN 5; 325 MG/1; MG/1
1 TABLET ORAL AS NEEDED
Status: DISCONTINUED | OUTPATIENT
Start: 2017-07-18 | End: 2017-07-18 | Stop reason: HOSPADM

## 2017-07-18 RX ORDER — ONDANSETRON 2 MG/ML
4 INJECTION INTRAMUSCULAR; INTRAVENOUS ONCE
Status: DISCONTINUED | OUTPATIENT
Start: 2017-07-18 | End: 2017-07-18 | Stop reason: HOSPADM

## 2017-07-18 RX ORDER — EPHEDRINE SULFATE/0.9% NACL/PF 25 MG/5 ML
SYRINGE (ML) INTRAVENOUS AS NEEDED
Status: DISCONTINUED | OUTPATIENT
Start: 2017-07-18 | End: 2017-07-18 | Stop reason: HOSPADM

## 2017-07-18 RX ORDER — LIDOCAINE HYDROCHLORIDE 20 MG/ML
INJECTION, SOLUTION EPIDURAL; INFILTRATION; INTRACAUDAL; PERINEURAL AS NEEDED
Status: DISCONTINUED | OUTPATIENT
Start: 2017-07-18 | End: 2017-07-18 | Stop reason: HOSPADM

## 2017-07-18 RX ORDER — FENTANYL CITRATE 50 UG/ML
25 INJECTION, SOLUTION INTRAMUSCULAR; INTRAVENOUS
Status: ACTIVE | OUTPATIENT
Start: 2017-07-18 | End: 2017-07-18

## 2017-07-18 RX ORDER — HYDROMORPHONE HYDROCHLORIDE 2 MG/ML
0.5 INJECTION, SOLUTION INTRAMUSCULAR; INTRAVENOUS; SUBCUTANEOUS
Status: DISCONTINUED | OUTPATIENT
Start: 2017-07-18 | End: 2017-07-18 | Stop reason: HOSPADM

## 2017-07-18 RX ORDER — SODIUM CHLORIDE, SODIUM LACTATE, POTASSIUM CHLORIDE, CALCIUM CHLORIDE 600; 310; 30; 20 MG/100ML; MG/100ML; MG/100ML; MG/100ML
50 INJECTION, SOLUTION INTRAVENOUS CONTINUOUS
Status: DISCONTINUED | OUTPATIENT
Start: 2017-07-18 | End: 2017-07-18 | Stop reason: HOSPADM

## 2017-07-18 RX ORDER — FENTANYL CITRATE 50 UG/ML
INJECTION, SOLUTION INTRAMUSCULAR; INTRAVENOUS AS NEEDED
Status: DISCONTINUED | OUTPATIENT
Start: 2017-07-18 | End: 2017-07-18 | Stop reason: HOSPADM

## 2017-07-18 RX ORDER — SODIUM CHLORIDE 0.9 % (FLUSH) 0.9 %
5-10 SYRINGE (ML) INJECTION AS NEEDED
Status: DISCONTINUED | OUTPATIENT
Start: 2017-07-18 | End: 2017-07-18 | Stop reason: HOSPADM

## 2017-07-18 RX ORDER — SODIUM CHLORIDE, SODIUM LACTATE, POTASSIUM CHLORIDE, CALCIUM CHLORIDE 600; 310; 30; 20 MG/100ML; MG/100ML; MG/100ML; MG/100ML
125 INJECTION, SOLUTION INTRAVENOUS CONTINUOUS
Status: DISCONTINUED | OUTPATIENT
Start: 2017-07-18 | End: 2017-07-18 | Stop reason: HOSPADM

## 2017-07-18 RX ORDER — MIDAZOLAM HYDROCHLORIDE 1 MG/ML
INJECTION, SOLUTION INTRAMUSCULAR; INTRAVENOUS AS NEEDED
Status: DISCONTINUED | OUTPATIENT
Start: 2017-07-18 | End: 2017-07-18 | Stop reason: HOSPADM

## 2017-07-18 RX ORDER — NALOXONE HYDROCHLORIDE 0.4 MG/ML
0.1 INJECTION, SOLUTION INTRAMUSCULAR; INTRAVENOUS; SUBCUTANEOUS
Status: DISCONTINUED | OUTPATIENT
Start: 2017-07-18 | End: 2017-07-18 | Stop reason: HOSPADM

## 2017-07-18 RX ORDER — CEFAZOLIN SODIUM 2 G/50ML
2 SOLUTION INTRAVENOUS ONCE
Status: COMPLETED | OUTPATIENT
Start: 2017-07-18 | End: 2017-07-18

## 2017-07-18 RX ADMIN — PROPOFOL 100 MG: 10 INJECTION, EMULSION INTRAVENOUS at 07:35

## 2017-07-18 RX ADMIN — SODIUM CHLORIDE, SODIUM LACTATE, POTASSIUM CHLORIDE, AND CALCIUM CHLORIDE 125 ML/HR: 600; 310; 30; 20 INJECTION, SOLUTION INTRAVENOUS at 06:33

## 2017-07-18 RX ADMIN — ONDANSETRON 4 MG: 2 INJECTION INTRAMUSCULAR; INTRAVENOUS at 07:40

## 2017-07-18 RX ADMIN — CEFAZOLIN SODIUM 2 G: 2 SOLUTION INTRAVENOUS at 07:27

## 2017-07-18 RX ADMIN — SODIUM CHLORIDE, SODIUM LACTATE, POTASSIUM CHLORIDE, AND CALCIUM CHLORIDE 50 ML/HR: 600; 310; 30; 20 INJECTION, SOLUTION INTRAVENOUS at 08:22

## 2017-07-18 RX ADMIN — FENTANYL CITRATE 25 MCG: 50 INJECTION, SOLUTION INTRAMUSCULAR; INTRAVENOUS at 07:40

## 2017-07-18 RX ADMIN — MIDAZOLAM HYDROCHLORIDE 2 MG: 1 INJECTION, SOLUTION INTRAMUSCULAR; INTRAVENOUS at 07:27

## 2017-07-18 RX ADMIN — LIDOCAINE HYDROCHLORIDE 40 MG: 20 INJECTION, SOLUTION EPIDURAL; INFILTRATION; INTRACAUDAL; PERINEURAL at 07:35

## 2017-07-18 RX ADMIN — FENTANYL CITRATE 25 MCG: 50 INJECTION, SOLUTION INTRAMUSCULAR; INTRAVENOUS at 07:45

## 2017-07-18 RX ADMIN — DEXAMETHASONE SODIUM PHOSPHATE 4 MG: 4 INJECTION, SOLUTION INTRA-ARTICULAR; INTRALESIONAL; INTRAMUSCULAR; INTRAVENOUS; SOFT TISSUE at 07:37

## 2017-07-18 RX ADMIN — Medication 10 MG: at 07:42

## 2017-07-18 RX ADMIN — FENTANYL CITRATE 50 MCG: 50 INJECTION, SOLUTION INTRAMUSCULAR; INTRAVENOUS at 07:54

## 2017-07-18 NOTE — PROGRESS NOTES
conducted a pre-surgery visit with Gaye Ocampo, who is a 73 y.o.,female. The  provided the following Interventions:  Initiated a relationship of care and support. Offered prayer and assurance of continued prayers on patient's behalf. Plan:  Chaplains will continue to follow and will provide pastoral care on an as needed/requested basis.  recommends bedside caregivers page  on duty if patient shows signs of acute spiritual or emotional distress.     [address] [facility],[address], [address] office  [phone] pager

## 2017-07-18 NOTE — PERIOP NOTES
TRANSFER - IN REPORT:    Verbal report received from JEAN-CLAUDE Pope RN and Frannie WHITE(name) on Shelton Alcantara  being received from OR(unit) for routine post - op      Report consisted of patients Situation, Background, Assessment and   Recommendations(SBAR). Information from the following report(s) SBAR, OR Summary, Procedure Summary, Intake/Output and MAR was reviewed with the receiving nurse. Opportunity for questions and clarification was provided. Assessment completed upon patients arrival to unit and care assumed.

## 2017-07-18 NOTE — ANESTHESIA POSTPROCEDURE EVALUATION
Post-Anesthesia Evaluation and Assessment    Cardiovascular Function/Vital Signs  Visit Vitals    /57    Pulse 64    Temp 36.4 °C (97.6 °F)    Resp 13    Ht 5' 9\" (1.753 m)    Wt 73.7 kg (162 lb 6.4 oz)    SpO2 100%    BMI 23.98 kg/m2       Patient is status post Procedure(s):  CYSTOSCOPY,BILATERAL RETROGRADE PYELOGRAM WITH INTERPRETATION,BILATERAL STENT CHANGE W/C-ARM - SPEC POP. Nausea/Vomiting: Controlled. Postoperative hydration reviewed and adequate. Pain:  Pain Scale 1: Numeric (0 - 10) (07/18/17 0830)  Pain Intensity 1: 0 (07/18/17 0830)   Managed. Neurological Status:   Neuro (WDL): Exceptions to WDL (07/18/17 0809)   At baseline. Mental Status and Level of Consciousness: Baseline and appropriate for discharge. Pulmonary Status:   O2 Device: Room air (07/18/17 0830)   Adequate oxygenation and airway patent. Complications related to anesthesia: None    Post-anesthesia assessment completed. No concerns. Patient has met all discharge requirements.     Signed By: Manuel Watson MD    July 18, 2017

## 2017-07-18 NOTE — ANESTHESIA PREPROCEDURE EVALUATION
Anesthetic History   No history of anesthetic complications            Review of Systems / Medical History  Patient summary reviewed, nursing notes reviewed and pertinent labs reviewed    Pulmonary          Shortness of breath (dyspnea on exertion)    Pertinent negatives: No asthma and smoker (quit 4-6 months ago)     Neuro/Psych         Psychiatric history     Cardiovascular    Hypertension: well controlled      CHF (remote history of CHF and cardiomyopathy, EF improved and no longer a current problem): NYHA Classification I    PAD (S/P AAA repair and amputation)    Exercise tolerance: <4 METS     GI/Hepatic/Renal     GERD: well controlled           Endo/Other        Arthritis     Other Findings   Comments: Chronic pain - phantom limb pain           Physical Exam    Airway  Mallampati: II  TM Distance: 4 - 6 cm  Neck ROM: normal range of motion   Mouth opening: Normal     Cardiovascular               Dental    Dentition: Lower partial plate and Implants     Pulmonary                 Abdominal         Other Findings            Anesthetic Plan    ASA: 3  Anesthesia type: general          Induction: Intravenous  Anesthetic plan and risks discussed with: Patient      Stopped xarelto appropriately  Cardiac clearance on chart

## 2017-07-18 NOTE — BRIEF OP NOTE
BRIEF OPERATIVE NOTE    Date of Procedure: 7/18/2017   Preoperative Diagnosis: GROSS HEMATURIA, BILATERAL HYDRONEPHROSIS  Postoperative Diagnosis: GROSS HEMATURIA, BILATERAL HYDRONEPHROSIS    Procedure(s):  CYSTOSCOPY,BILATERAL RETROGRADE PYELOGRAM WITH INTERPRETATION,BILATERAL STENT CHANGE W/C-ARM - SPEC POP  Surgeon(s) and Role:     * Radha Otto MD - Primary         Assistant Staff:       Surgical Staff:  Circ-1: Yuriy Bob RN  Radiology Technician: Shen Fu  Scrub Tech-1: Mariajose Go  Scrub RN-1: Alyce Conley RN  Event Time In   Incision Start 4360   Incision Close 0802     Anesthesia: General   Estimated Blood Loss: none  Specimens: * No specimens in log *   Findings: normal bladder, 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  Item Serial No.  Lot No. LRB No. Used Action   ureteral stent    [employer] [id number] Left 1 Implanted   ureteral stent       [employer] [id number] Right 1 Implanted

## 2017-07-18 NOTE — PERIOP NOTES
TRANSFER - OUT REPORT:    Verbal report given to GLORIA Davies RN(name) on Tara Martinez  being transferred to phase 2(unit) for routine progression of care       Report consisted of patients Situation, Background, Assessment and   Recommendations(SBAR). Information from the following report(s) SBAR, OR Summary, Procedure Summary, Intake/Output and MAR was reviewed with the receiving nurse. Lines:   Peripheral IV 07/18/17 Right Hand (Active)   Site Assessment Clean, dry, & intact 7/18/2017  8:09 AM   Phlebitis Assessment 0 7/18/2017  8:09 AM   Infiltration Assessment 0 7/18/2017  8:09 AM   Dressing Status Clean, dry, & intact 7/18/2017  8:09 AM   Dressing Type Transparent;Tape 7/18/2017  8:09 AM   Hub Color/Line Status Pink; Infusing;Patent 7/18/2017  8:09 AM        Opportunity for questions and clarification was provided.       Patient transported with:   [employer]

## 2017-07-18 NOTE — DISCHARGE INSTRUCTIONS
DISCHARGE SUMMARY from Nurse    The following personal items are in your possession at time of discharge:    Dental Appliances: Lowers  Visual Aid: Glasses, Contacts     Home Medications: None  Jewelry: None  Clothing: Pants, Shirt, Undergarments (all given to )  Other Valuables: None             PATIENT INSTRUCTIONS:    After general anesthesia or intravenous sedation, for 24 hours or while taking prescription Narcotics:  · Limit your activities  · Do not drive and operate hazardous machinery  · Do not make important personal or business decisions  · Do  not drink alcoholic beverages  · If you have not urinated within 8 hours after discharge, please contact your surgeon on call. Report the following to your surgeon:  · Excessive pain, swelling, redness or odor of or around the surgical area  · Temperature over 100.5  · Nausea and vomiting lasting longer than 4 hours or if unable to take medications  · Any signs of decreased circulation or nerve impairment to extremity: change in color, persistent  numbness, tingling, coldness or increase pain  · Any questions        What to do at Home:  Regular diet drink lots of liquids  Ok to shower  Dr Liz Joyner will call regarding a post op check up    If you experience any of the following symptoms heavy bleeding, unable to void, fevers, severe pain, please follow up with dr Liz Joyner. *  Please give a list of your current medications to your Primary Care Provider. *  Please update this list whenever your medications are discontinued, doses are      changed, or new medications (including over-the-counter products) are added. *  Please carry medication information at all times in case of emergency situations. These are general instructions for a healthy lifestyle:    No smoking/ No tobacco products/ Avoid exposure to second hand smoke    Surgeon General's Warning:  Quitting smoking now greatly reduces serious risk to your health.     Obesity, smoking, and sedentary lifestyle greatly increases your risk for illness    A healthy diet, regular physical exercise & weight monitoring are important for maintaining a healthy lifestyle    You may be retaining fluid if you have a history of heart failure or if you experience any of the following symptoms:  Weight gain of 3 pounds or more overnight or 5 pounds in a week, increased swelling in our hands or feet or shortness of breath while lying flat in bed. Please call your doctor as soon as you notice any of these symptoms; do not wait until your next office visit. Recognize signs and symptoms of STROKE:    F-face looks uneven    A-arms unable to move or move unevenly    S-speech slurred or non-existent    T-time-call 911 as soon as signs and symptoms begin-DO NOT go       Back to bed or wait to see if you get better-TIME IS BRAIN. Warning Signs of HEART ATTACK     Call 911 if you have these symptoms:   Chest discomfort. Most heart attacks involve discomfort in the center of the chest that lasts more than a few minutes, or that goes away and comes back. It can feel like uncomfortable pressure, squeezing, fullness, or pain.  Discomfort in other areas of the upper body. Symptoms can include pain or discomfort in one or both arms, the back, neck, jaw, or stomach.  Shortness of breath with or without chest discomfort.  Other signs may include breaking out in a cold sweat, nausea, or lightheadedness. Don't wait more than five minutes to call 911 - MINUTES MATTER! Fast action can save your life. Calling 911 is almost always the fastest way to get lifesaving treatment. Emergency Medical Services staff can begin treatment when they arrive -- up to an hour sooner than if someone gets to the hospital by car. The discharge information has been reviewed with the patient and spouse. The patient and spouse verbalized understanding.     Discharge medications reviewed with the patient and spouse and appropriate educational materials and side effects teaching were provided.       Patient armband removed and shredded

## 2017-07-18 NOTE — H&P
History and Physical    Subjective:      Vivi Diez is a 73 y.o. female with bilateral hydro and gross hematuria secondary to stetns and Xrelto use.   She presents for stetn change and downsizing of stents    Past Medical History:   Diagnosis Date    Adverse effect of anesthesia     agitation    Adverse effect of anesthesia 2013    stopped breating during surgery at [facility]    Allergic rhinitis     Anemia 07/06/2017    requiring blood transfusions x 3 in the past few weeks due to hematuria    Anemia NEC     Aneurysm ([address] 2012    AAA repair    Anxiety     Asthma     Chronic kidney disease 2015    right kidney blockage    Chronic kidney disease     per pt kidneys not working that well    Chronic pain     phantom leg pain    Coagulation disorder ([address]     xarelto    Depression     Diplopia     Diverticulosis     [facility]deep venous thrombosis) [facility] [facility] 2013    GERD (gastroesophageal reflux disease)     Glucose intolerance (impaired glucose tolerance)     HX OTHER MEDICAL     atheroscl art extrem intermit inessa    Hypertension 1990's    Hypokalemia     ILD (interstitial lung disease) (HCC)     Lower limb ischemia     Neuropathy (HCC)     OA (osteoarthritis)     PAD (peripheral artery disease) (HCC)     Paresthesia of left leg     Pleurisy     Polyarthralgia     Respiratory failure (HCC)     Tremor, essential     Urinary incontinence      Past Surgical History:   Procedure Laterality Date    HX AAA REPAIR  2012    using bifurcation graft    HX ABOVE KNEE AMPUTATION Left 2012    HX CERVICAL LAMINECTOMY      HX CHOLECYSTECTOMY      HX GYN      hysterectomy    HX SALPINGO-OOPHORECTOMY      HX UROLOGICAL  2014, 2015    Cysto    HX UROLOGICAL      stents      Family History   Problem Relation Age of Onset    Stroke Father     Heart Failure Father     Hypertension Mother     Hypertension Brother      Social History   Substance Use Topics    Smoking status: Former Smoker     Packs/day: 1.00     Years: 40.00     Quit date: 6/29/2016    Smokeless tobacco: Never Used    Alcohol use No     Allergies   Allergen Reactions    Heparin Analogues Other (comments)     Opposite reaction to heparin    Iodinated Contrast- Oral And Iv Dye Hives     Prior to Admission medications    Medication Sig Start Date End Date Taking? Authorizing Provider   isosorbide mononitrate ER (IMDUR) 60 mg CR tablet Take 60 mg by mouth every morning. Yes Historical Provider   oxyCODONE IR (ROXICODONE) 15 mg immediate release tablet Take 1 Tab by mouth four (4) times daily as needed for Pain for up to 30 days. Max Daily Amount: 4 Tabs. May take 1/2 to 1 tab QID prn breakthrough pain  Indications: NEUROPATHIC PAIN, Pain 8/29/17 9/28/17 Yes Kami Velazquez PA-C   methadone (DOLOPHINE) 10 mg tablet Take 2 Tabs by mouth four (4) times daily for 30 days. Max Daily Amount: 80 mg. for chronic, severe, refractory pain. Indications: Chronic Pain, Severe Pain 8/29/17 9/28/17 Yes Kami Velazquez PA-C   ferrous sulfate (IRON) 325 mg (65 mg iron) tablet Take  by mouth three (3) times daily (with meals). Yes Historical Provider   bisacodyl (CORRECTOL) 5 mg tab Take  by mouth. Yes Historical Provider   venlafaxine-SR (EFFEXOR-XR) 150 mg capsule TAKE 1 CAPSULE BY MOUTH DAILY FOR 30 DAYS 10/19/16  Yes Alia Nickerson MD   losartan (COZAAR) 25 mg tablet Take 25 mg by mouth daily. Indications: HYPERTENSION   Yes Historical Provider   carvedilol (COREG) 12.5 mg tablet Take 12.5 mg by mouth two (2) times daily (with meals). Indications: HYPERTENSION, CHF   Yes Historical Provider   atorvastatin (LIPITOR) 80 mg tablet Take 80 mg by mouth nightly. Indications: HYPERCHOLESTEROLEMIA   Yes Historical Provider   polyvinyl alcohol (LIQUIFILM TEARS) 1.4 % ophthalmic solution Administer 1 Drop to both eyes as needed. Yes Historical Provider   potassium chloride (KLOR-CON) 20 mEq packet Take 20 mEq by mouth two (2) times a day.  Indications: HYPOKALEMIA PREVENTION   Yes Historical Provider   albuterol (PROVENTIL HFA, VENTOLIN HFA, PROAIR HFA) 90 mcg/actuation inhaler Take 2 Puffs by inhalation as needed for Wheezing. Indications: asthma   Yes Historical Provider   omeprazole (PRILOSEC) 20 mg capsule Take 20 mg by mouth daily. Indications: GASTROESOPHAGEAL REFLUX   Yes Historical Provider   gabapentin (NEURONTIN) 600 mg tablet Take 600 mg by mouth three (3) times daily. Indications: NEUROPATHIC PAIN    Historical Provider   naloxone 4 mg/actuation spry 4 mg by Nasal route once as needed (opioid overdose) for up to 1 dose. May substitute 2 mg syringe if insurance requires 17   Cira Solid, PA-C   rivaroxaban (XARELTO) 10 mg tablet Take  by mouth daily. Historical Provider   furosemide (LASIX) 40 mg tablet Take 40 mg by mouth daily. Historical Provider   senna-docusate (PERICOLACE) 8.6-50 mg per tablet Take 1 Tab by mouth as needed for Constipation. Alternates with Docusate sodium. Historical Provider        Review of Systems:  A comprehensive review of systems was negative except for that written in the HPI. Objective:      Patient Vitals for the past 8 hrs:   BP Temp Pulse Resp SpO2 Height Weight   17 0544 125/55 98.5 °F (36.9 °C) 67 16 98 % 5' 9\" (1.753 m) 73.7 kg (162 lb 6.4 oz)       Temp (24hrs), Av.5 °F (36.9 °C), Min:98.5 °F (36.9 °C), Max:98.5 °F (36.9 °C)      Physical Exam:  GENERAL: alert, cooperative, no distress, appears stated age, THROAT & NECK: normal and no erythema or exudates noted. , LUNG: breathing easy, HEART: regular rate and rhythm, ABDOMEN: soft, non-tender. Bowel sounds normal. No masses,  no organomegaly, EXTREMITIES:  no edema,left lower leg absent SKIN: Normal.      Assessment:     bilateral hydronephrosis secondary to vascular grafts and problems with chronic hematuria    Plan:     1. The risks, benefits, complications, treatment options, and expected outcomes were discussed with the patient. The patient understands the risks, any and all questions were answered to the patient's satisfaction.   2. Proceed with cysto bilateral RPG's and bilateral stent changes    Signed By: Shannan Zelaya MD                         July 18, 2017

## 2017-07-18 NOTE — IP AVS SNAPSHOT
Giovanna Cadena 
 
 
 [address] [address] Patient: Chelita Naidu MRN: [id number] TXF:[id number] You are allergic to the following Allergen Reactions Heparin Analogues Other (comments) Opposite reaction to heparin Iodinated Contrast- Oral And Iv Dye Hives Recent Documentation Height Weight BMI OB Status Smoking Status 1.753 m 73.7 kg 23.98 kg/m2 Hysterectomy Former Smoker Emergency Contacts Name Discharge Info Relation Home Work Mobile [facility] [facility] CARE[facility] [3] Spouse [3] [phone] About your hospitalization You were admitted on:  July 18, 2017 You last received care in the:  [facility] PHASE 2 RECOVERY You were discharged on:  July 18, 2017 Unit phone number:    
  
Why you were hospitalized Your primary diagnosis was:  Hydronephrosis, Bilateral  
 Your diagnoses also included:  Gross Hematuria Providers Seen During Your Hospitalizations Provider Role Specialty Primary office phone Samantha Lopez MD Attending Provider Urology [phone] Your Primary Care Physician (PCP) Primary Care Physician Office Phone Office Fax Norris Marcus, [address] [place] [address] [phone] Follow-up Information Follow up With Details Comments Contact Info Karine Davis MD   [place] [address] 
[phone] Current Discharge Medication List  
  
START taking these medications Dose & Instructions Dispensing Information Comments Morning Noon Evening Bedtime  
 cephALEXin 500 mg capsule Commonly known as:  Lorenda Darting Your last dose was: Your next dose is:    
   
   
 Dose:  500 mg Take 1 Cap by mouth three (3) times daily. Quantity:  15 Cap Refills:  0 CONTINUE these medications which have NOT CHANGED Dose & Instructions Dispensing Information Comments Morning Noon Evening Bedtime  
 albuterol 90 mcg/actuation inhaler Commonly known as:  PROVENTIL HFA, VENTOLIN HFA, PROAIR HFA Your last dose was: Your next dose is:    
   
   
 Dose:  2 Puff Take 2 Puffs by inhalation as needed for Wheezing. Indications: asthma Refills:  0  
     
   
   
   
  
 atorvastatin 80 mg tablet Commonly known as:  LIPITOR Your last dose was: Your next dose is:    
   
   
 Dose:  80 mg Take 80 mg by mouth nightly. Indications: HYPERCHOLESTEROLEMIA Refills:  0  
     
   
   
   
  
 carvedilol 12.5 mg tablet Commonly known as:  Marylou Menghini Your last dose was: Your next dose is:    
   
   
 Dose:  12.5 mg Take 12.5 mg by mouth two (2) times daily (with meals). Indications: HYPERTENSION, CHF Refills:  0 CORRECTOL 5 mg Tab Generic drug:  bisacodyl Your last dose was: Your next dose is: Take  by mouth. Refills:  0  
     
   
   
   
  
 furosemide 40 mg tablet Commonly known as:  LASIX Your last dose was: Your next dose is:    
   
   
 Dose:  40 mg Take 40 mg by mouth daily. Refills:  0  
     
   
   
   
  
 gabapentin 600 mg tablet Commonly known as:  NEURONTIN Your last dose was: Your next dose is:    
   
   
 Dose:  600 mg Take 600 mg by mouth three (3) times daily. Indications: NEUROPATHIC PAIN Refills:  0 Iron 325 mg (65 mg iron) tablet Generic drug:  ferrous sulfate Your last dose was: Your next dose is: Take  by mouth three (3) times daily (with meals). Refills:  0  
     
   
   
   
  
 isosorbide mononitrate ER 60 mg CR tablet Commonly known as:  IMDUR Your last dose was: Your next dose is:    
   
   
 Dose:  60 mg Take 60 mg by mouth every morning. Refills:  0 losartan 25 mg tablet Commonly known as:  COZAAR Your last dose was: Your next dose is:    
   
   
 Dose:  25 mg Take 25 mg by mouth daily. Indications: HYPERTENSION Refills:  0  
     
   
   
   
  
 methadone 10 mg tablet Commonly known as:  DOLOPHINE Start taking on:  8/29/2017 Your last dose was: Your next dose is:    
   
   
 Dose:  20 mg Take 2 Tabs by mouth four (4) times daily for 30 days. Max Daily Amount: 80 mg. for chronic, severe, refractory pain. Indications: Chronic Pain, Severe Pain Quantity:  240 Tab Refills:  0  
     
   
   
   
  
 naloxone 4 mg/actuation Spry Your last dose was: Your next dose is:    
   
   
 Dose:  4 mg 4 mg by Nasal route once as needed (opioid overdose) for up to 1 dose. May substitute 2 mg syringe if insurance requires Quantity:  1 Package Refills:  0  
     
   
   
   
  
 omeprazole 20 mg capsule Commonly known as:  PRILOSEC Your last dose was: Your next dose is:    
   
   
 Dose:  20 mg Take 20 mg by mouth daily. Indications: GASTROESOPHAGEAL REFLUX Refills:  0  
     
   
   
   
  
 oxyCODONE IR 15 mg immediate release tablet Commonly known as:  Conchetta Little Start taking on:  8/29/2017 Your last dose was: Your next dose is:    
   
   
 Dose:  15 mg Take 1 Tab by mouth four (4) times daily as needed for Pain for up to 30 days. Max Daily Amount: 4 Tabs. May take 1/2 to 1 tab QID prn breakthrough pain  Indications: NEUROPATHIC PAIN, Pain Quantity:  120 Tab Refills:  0  
     
   
   
   
  
 polyvinyl alcohol 1.4 % ophthalmic solution Commonly known as:  Sharyon Nageotte Your last dose was: Your next dose is:    
   
   
 Dose:  1 Drop Administer 1 Drop to both eyes as needed. Refills:  0  
     
   
   
   
  
 potassium chloride 20 mEq packet Commonly known as:  KLOR-CON Your last dose was: Your next dose is: Dose:  20 mEq Take 20 mEq by mouth two (2) times a day. Indications: HYPOKALEMIA PREVENTION Refills:  0  
     
   
   
   
  
 senna-docusate 8.6-50 mg per tablet Commonly known as:  Kimberly Cloverport Your last dose was: Your next dose is:    
   
   
 Dose:  1 Tab Take 1 Tab by mouth as needed for Constipation. Alternates with Docusate sodium. Refills:  0  
     
   
   
   
  
 venlafaxine- mg capsule Commonly known as:  EFFEXOR-XR Your last dose was: Your next dose is: TAKE 1 CAPSULE BY MOUTH DAILY FOR 30 DAYS Quantity:  30 Cap Refills:  11 XARELTO 10 mg tablet Generic drug:  rivaroxaban Your last dose was: Your next dose is: Take  by mouth daily. Refills:  0 Where to Get Your Medications Information on where to get these meds will be given to you by the nurse or doctor. ! Ask your nurse or doctor about these medications  
  cephALEXin 500 mg capsule Discharge Instructions DISCHARGE SUMMARY from Nurse The following personal items are in your possession at time of discharge: 
 
Dental Appliances: Lowers Visual Aid: Glasses, Contacts Home Medications: None Jewelry: None Clothing: Pants, Shirt, Undergarments (all given to ) Other Valuables: None PATIENT INSTRUCTIONS: 
 
 
F-face looks uneven A-arms unable to move or move unevenly S-speech slurred or non-existent T-time-call 911 as soon as signs and symptoms begin-DO NOT go Back to bed or wait to see if you get better-TIME IS BRAIN. Warning Signs of HEART ATTACK Call 911 if you have these symptoms: 
? Chest discomfort. Most heart attacks involve discomfort in the center of the chest that lasts more than a few minutes, or that goes away and comes back. It can feel like uncomfortable pressure, squeezing, fullness, or pain. ? Discomfort in other areas of the upper body. Symptoms can include pain or discomfort in one or both arms, the back, neck, jaw, or stomach. ? Shortness of breath with or without chest discomfort. ? Other signs may include breaking out in a cold sweat, nausea, or lightheadedness. Don't wait more than five minutes to call [address]! Fast action can save your life. Calling 911 is almost always the fastest way to get lifesaving treatment. Emergency Medical Services staff can begin treatment when they arrive  up to an hour sooner than if someone gets to the hospital by car. The discharge information has been reviewed with the patient and spouse. The patient and spouse verbalized understanding. Discharge medications reviewed with the patient and spouse and appropriate educational materials and side effects teaching were provided. Patient armband removed and shredded Discharge Orders None Introducing [facility] SERVICES! Dear Natalya Mcintyre: Thank you for requesting a [employer] ( [employer] ) account. Our records indicate that you already have an active [employer] ) account. You can access your account anytime at [email or web address] Did you know that you can access your hospital and ER discharge instructions at any time in [employer] )? You can also review all of your test results from your hospital stay or ER visit. Additional Information If you have questions, please visit the Frequently Asked Questions section of the [employer] ) website at [email or web address] Remember, [employer] ( [employer] ) is NOT to be used for urgent needs. For medical emergencies, dial 911. Now available from your iPhone and Android! General Information Please provide this summary of care documentation to your next provider. Patient Signature:  ____________________________________________________________ Date:  ____________________________________________________________  
  
Artie Matsu Provider Signature:  ____________________________________________________________ Date:  ____________________________________________________________

## 2017-08-01 NOTE — OP NOTES
[address] [place] [address]  OPERATIVE REPORT    Name:  Keith Avilez  MR#:  [id number]  :  1950  Account #:  [de-identified]  Date of Adm:  2017  Date of Surgery:  2017      PREOPERATIVE DIAGNOSIS: Gross hematuria and bilateral  hydronephrosis. POSTOPERATIVE DIAGNOSIS: Gross hematuria and bilateral  hydronephrosis. PROCEDURES PERFORMED  1. Cystoscopy. 2. Bilateral retrograde pyelograms with interpretation. 3. Bilateral stent change. SURGEON: Gus Walters MD    ANESTHESIA: General.    ESTIMATED BLOOD LOSS: None. SPECIMENS REMOVED: None. FINDINGS: There was a normal bladder with bilateral stricture of the  ureters at the pelvic inlet. COMPLICATIONS: None. IMPLANTS: Bilateral Bard 6 x 24 stents. CLINICAL NOTE: The patient is a 81-year-old female with a history of  vasculopathy. She has bilateral iliac grafts. She has significant scarring  around the ureters and has stents in place for bilateral hydro. OPERATIVE PROCEDURE: Preoperatively, risks and benefits of  surgery were described to the patient. The risks include, but are not  limited to bleeding, infection, injury to bladder, injury to ureter, injury to  kidney, possible need for future procedures, possible continued  bleeding, possible failure of the stents or obstruction. The patient  assumed the risks and signed the informed consent. The patient was taken to the operating room, placed on the OR table in  supine position. She was administered general anesthetic. She was  administered intravenous antibiotics. She was placed in dorsal  lithotomy position, prepped and draped in the usual sterile manner. A  21-Spanish cystoscope and sheath were then inserted transurethrally  atraumatically under direct vision using a 30-degree lens. Panendoscopy was done. The urethra was normal. Bilateral ureteral  orifices were in normal anatomic position.  There were 2 stents  emanating from the right ureteral orifice and 1 stent emanating from  the left. There were no stones, no tumors, no areas of concern within  the bladder. There was no area of active bleeding within the bladder. There was no blood coming out of either ureter. The UO was observed for  several minutes. Next, using alligator forceps, all the stents were  grasped and removed. Next, I inserted a 5-[place] open-ended ureteral catheter into the  bladder and cannulated the right ureteral orifice. Retrograde pyelogram  was done in the usual manner using 25 mL of Visipaque dye. There  was noted to be a very tight stricture right at the pelvic inlet. This was  quite long in nature. Proximal to this had moderate to severe  hydroureteronephrosis with blunted calices. Next, I passed a Sensor wire through the open-ended catheter up to  the kidney. The open-ended catheter was removed. I then passed a 6-  [place] 24-cm stent over the wire up to the kidney. The wire was  removed. Fluoroscopy confirmed the proximal coil was in the kidney,  the distal coil was noted to be in the bladder by direct vision. At this  point, I cannulated the left ureteral orifice with a 5-[place] Islander open-ended  ureteral catheter. Retrograde pyelogram was done using 18 mL of  Visipaque dye. There was noted to be again a filling defect where the  ureter was narrowed at the pelvic inlet. Proximal to this, there was  moderate hydroureteronephrosis with some tortuosity. There was no  blunting of calices on the left side. Next, I passed a Sensor wire  through the open-ended catheter up to the kidney and then the open-ended  catheter was removed. I then passed 6-[place] Islander 24 cm stents over the  wire up to the kidney. The wire was removed. Fluoroscopy confirmed  the proximal coil was in the kidney, the distal coil was noted to be in  the bladder by direct vision. The patient was taken out of lithotomy  position, revived from anesthesia and taken to recovery in stable  condition.         Anastacia Navarro MD    [facility] / [facility]  D:  08/01/2017   15:59  T:  08/01/2017   18:07  Job #:  [id number]

## 2017-09-21 RX ORDER — METHADONE HYDROCHLORIDE 10 MG/1
20 TABLET ORAL 4 TIMES DAILY
Qty: 240 TAB | Refills: 0 | Status: SHIPPED | OUTPATIENT
Start: 2017-09-29 | End: 2017-01-01 | Stop reason: SDUPTHER

## 2017-09-21 RX ORDER — OXYCODONE HYDROCHLORIDE 15 MG/1
15 TABLET ORAL
Qty: 120 TAB | Refills: 0 | Status: SHIPPED | OUTPATIENT
Start: 2017-09-29 | End: 2017-01-01 | Stop reason: SDUPTHER

## 2017-09-21 NOTE — TELEPHONE ENCOUNTER
The pt was called to be informed that the provider will not be in the office for her appt and will need to be rescheduled. A  was obtained and there were no aberrancies noted. The last fill date for her methadone and oxycodone was 08/31/17. Her  answered the phone and rescheduled the pt's appt for her to 10/13/17 at 1400 with KG. He is on her HIPPA form. The pt is currently sleeping. He states that he will be by the office tomorrow to  her prescriptions. At this time the pt is tolerating her medications well.

## 2017-10-13 NOTE — PROGRESS NOTES
HISTORY OF PRESENT ILLNESS  Fannie Cespedes is a 73 y.o. female. She returns for followup of chronic, severe pain which includes phantom pain status post left AKA as well as chronic bilateral leg pain due to severe PAD. She also endorses widespread myofascial pain, anxiety and poor sleep, likely attributable to fibromyalgia. She continues to complain of vexing, constant pain in multiple locations, including the left stump, right hip, foot and hand, as well as the lower back and neck. Pain continues to predominate in the left leg stump, which she describes as burning, stabbing, and sharp. Pain is constant but worsens with physical exertion, inclement weather, and stress. She has recently restarted smoking due to the stress of her pain, and we discussed the potential ramifications of this, and encouraged that she quit again. Pt reports average of 5-6/10 pain scale most days. Medications continue to work well for pain control overall. Fannie Cespedes is tolerating medications well, with no untoward side effects noted. She is able to stay more active with less discomfort with these current doses. The patient reports an average of 60% relief with this regimen. Most recent UDS and  were consistent with prescribed medications. Pill counts are appropriate. She is informed of side effects, risks, and benefits of this regimen, and emphasizes that she derives a significant improvement in functionality and quality of life, and notes that non-opioid medications and therapies in the past have not offered significant benefit. We also discussed the departure of Dr. Kt Green and myself from the practice and discussed at length \"next steps\". She chooses to stay with the practice for the time being but may choose to follow Dr. Kt Green if this practice declines to continue this current regimen. She denies new or worsening insomnia or constipation issues.  She denies any falls, injuries, or hospitalizations since the last visit.  A total of 45 minutes was spent with the patient of which more than 50% of the time was spent counseling the patient. HPI--see above    ROS  Constitutional: Negative for weight loss. Respiratory: Negative for shortness of breath. Gastrointestinal: Negative for constipation. Musculoskeletal: Positive for myalgias, back pain, joint pain and neck pain. Negative for falls. Skin: Negative for rash. Neurological: Positive for sensory change. Memory loss   Physical Exam  Constitutional: She is oriented to person, place, and time. She appears well-developed and well-nourished. She is cooperative. No distress. HENT:   Head: Normocephalic and atraumatic. Pulmonary/Chest: Effort normal. No respiratory distress. Musculoskeletal:        Right upper leg: She exhibits deformity (AKA). Neurological: She is alert and oriented to person, place, and time. Gait abnormal.   Skin: Skin is warm and dry. She is not diaphoretic. Psychiatric: She has a normal mood and affect. Her speech is normal and behavior is normal. Thought content normal.   ASSESSMENT and PLAN    ICD-10-CM ICD-9-CM    1. Phantom limb pain (HCC) G54.6 353.6    2. Neuralgia M79.2 729.2    3. Chronic pain syndrome G89.4 338.4    4. Pain of left lower extremity M79.605 729.5    5. Status post above knee amputation of left lower extremity ([facility] [address] Z89.612 V49.76    6. Encounter for long-term (current) drug use Z79.899 V58.69    7. Chronic intractable headache, unspecified headache type R51 784.0    8. Insomnia secondary to chronic pain G89.29 338.29     G47.01 327.01    9. Encounter for long-term (current) use of high-risk medication Z79.899 V58.69    10.  PAD (peripheral artery disease) ([facility]) I73.9 443.9       Plan:  Continue same medications as prescribed for chronic pain  Follow up in 3 months or sooner if needed  Regular exercise and attention to emotional health and diet remain the most effective ways to treat chronic pain of all kinds  You may contact me with questions or concerns through [employer]  Consider transitioning care to Dr. Fredy Shaikh at his new practice, with Dr. Lelo Barnes in [facility] [address], [address] [place] [address]   Phone: [phone]  Fax: [phone]

## 2017-10-13 NOTE — PATIENT INSTRUCTIONS
Plan:  Continue same medications as prescribed for chronic pain  Follow up in 3 months or sooner if needed  Regular exercise and attention to emotional health and diet remain the most effective ways to treat chronic pain of all kinds  You may contact me with questions or concerns through [employer]  Consider transitioning care to Dr. Mike Essex at his new practice, with Dr. Araseli Luong in [address] [place] [address]  Phone: [phone]  Fax: [phone]

## 2017-10-13 NOTE — MR AVS SNAPSHOT
Visit Information Date & Time Provider Department Dept. Phone Encounter #  
 10/13/2017  2:00 PM [facility] [address] [place] [address] for Pain Management [phone] Follow-up Instructions Return in about 3 months (around 1/13/2018). Follow-up and Disposition History Upcoming Health Maintenance Date Due Hepatitis C Screening 1950 DTaP/Tdap/Td series (1 - Tdap) 10/30/1972 BREAST CANCER SCRN MAMMOGRAM 10/30/2001 FOBT Q 1 YEAR AGE 50-75 10/30/2001 ZOSTER VACCINE AGE 60> 8/30/2011 GLAUCOMA SCREENING Q2Y 10/30/2016 OSTEOPOROSIS SCREENING (DEXA) 10/30/2016 Pneumococcal 65+ Low/Medium Risk (1 of 2 - PCV13) 10/30/2016 MEDICARE YEARLY EXAM 10/30/2016 INFLUENZA AGE 9 TO ADULT 8/1/2017 Allergies as of 10/13/2017  Review Complete On: 10/13/2017 By: Ema Casas LPN Severity Noted Reaction Type Reactions Heparin Analogues  08/19/2014   Side Effect Other (comments) Opposite reaction to heparin Iodinated Contrast- Oral And Iv Dye    Hives Current Immunizations  Never Reviewed No immunizations on file. Not reviewed this visit You Were Diagnosed With   
  
 Codes Comments Phantom limb pain ([facility]    -  Primary ICD-10-CM: G54.6 ICD-9-CM: 353.6 Neuralgia     ICD-10-CM: M79.2 ICD-9-CM: 729.2 Chronic pain syndrome     ICD-10-CM: G89.4 ICD-9-CM: 338.4 Pain of left lower extremity     ICD-10-CM: M79.605 ICD-9-CM: 729.5 Status post above knee amputation of left lower extremity ([facility]     ICD-10-CM: E44.679 ICD-9-CM: V49.76 Encounter for long-term (current) drug use     ICD-10-CM: Z79.899 ICD-9-CM: V58.69 Chronic intractable headache, unspecified headache type     ICD-10-CM: R51 ICD-9-CM: 784.0 Insomnia secondary to chronic pain     ICD-10-CM: G89.29, G47.01 
ICD-9-CM: 338.29, 327.01  Encounter for long-term (current) use of high-risk medication ICD-10-CM: Q02.797 ICD-9-CM: V58.69 PAD (peripheral artery disease) ([facility])     ICD-10-CM: I73.9 ICD-9-CM: 443. 9 Vitals BP Pulse Temp Resp Weight(growth percentile) BMI  
 105/60 69 98.4 °F (36.9 °C) 14 162 lb (73.5 kg) 23.92 kg/m2 OB Status Smoking Status Hysterectomy Former Smoker BMI and BSA Data Body Mass Index Body Surface Area  
 23.92 kg/m 2 1.89 m 2 [facility] [address] [address] [phone] Your Updated Medication List  
  
   
This list is accurate as of: 10/13/17  3:22 PM.  Always use your most recent med list.  
  
  
  
  
 albuterol 90 mcg/actuation inhaler Commonly known as:  PROVENTIL HFA, VENTOLIN HFA, PROAIR HFA Take 2 Puffs by inhalation as needed for Wheezing. Indications: asthma  
  
 atorvastatin 80 mg tablet Commonly known as:  LIPITOR Take 80 mg by mouth nightly. Indications: HYPERCHOLESTEROLEMIA  
  
 carvedilol 12.5 mg tablet Commonly known as:  Jaqueline Zena Take 12.5 mg by mouth two (2) times daily (with meals). Indications: HYPERTENSION, CHF  
  
 cephALEXin 500 mg capsule Commonly known as:  Amedeo Ronde Take 1 Cap by mouth three (3) times daily. CORRECTOL 5 mg Tab Generic drug:  bisacodyl Take  by mouth.  
  
 enoxaparin 120 mg/0.8 mL injection Commonly known as:  LOVENOX  
120 mg by SubCUTAneous route daily. furosemide 40 mg tablet Commonly known as:  LASIX Take 40 mg by mouth daily. gabapentin 600 mg tablet Commonly known as:  NEURONTIN Take 600 mg by mouth three (3) times daily. Indications: NEUROPATHIC PAIN Iron 325 mg (65 mg iron) tablet Generic drug:  ferrous sulfate Take  by mouth three (3) times daily (with meals). isosorbide mononitrate ER 60 mg CR tablet Commonly known as:  IMDUR Take 60 mg by mouth every morning. losartan 25 mg tablet Commonly known as:  COZAAR  
 Take 25 mg by mouth daily. Indications: HYPERTENSION  
  
 methadone 10 mg tablet Commonly known as:  DOLOPHINE Take 2 Tabs by mouth four (4) times daily for 30 days. Max Daily Amount: 80 mg. for chronic, severe, refractory pain. Indications: Chronic Pain, Severe Pain Start taking on:  12/24/2017  
  
 naloxone 4 mg/actuation nasal spray Commonly known as:  NARCAN  
4 mg by Nasal route once as needed (opioid overdose) for up to 1 dose. May substitute 2 mg syringe if insurance requires  
  
 omeprazole 20 mg capsule Commonly known as:  PRILOSEC Take 20 mg by mouth daily. Indications: GASTROESOPHAGEAL REFLUX  
  
 oxyCODONE IR 15 mg immediate release tablet Commonly known as:  Cindy Danielitobradley Take 1 Tab by mouth four (4) times daily as needed for Pain for up to 30 days. Max Daily Amount: 4 Tabs. May take 1/2 to 1 tab QID prn breakthrough pain  Indications: NEUROPATHIC PAIN, Pain Start taking on:  12/24/2017 polyvinyl alcohol 1.4 % ophthalmic solution Commonly known as:  Kanwal Oms Administer 1 Drop to both eyes as needed. potassium chloride 20 mEq packet Commonly known as:  KLOR-CON Take 20 mEq by mouth two (2) times a day. Indications: HYPOKALEMIA PREVENTION  
  
 senna-docusate 8.6-50 mg per tablet Commonly known as:  Barbra Lipschutz Take 1 Tab by mouth as needed for Constipation. Alternates with Docusate sodium. venlafaxine- mg capsule Commonly known as:  EFFEXOR-XR  
TAKE 1 CAPSULE BY MOUTH DAILY FOR 30 DAYS  
  
 XARELTO 10 mg tablet Generic drug:  rivaroxaban Take  by mouth daily. Prescriptions Printed Refills  
 oxyCODONE IR (ROXICODONE) 15 mg immediate release tablet 0 Starting on: 12/24/2017 Sig: Take 1 Tab by mouth four (4) times daily as needed for Pain for up to 30 days. Max Daily Amount: 4 Tabs. May take 1/2 to 1 tab QID prn breakthrough pain  Indications: NEUROPATHIC PAIN, Pain Class: Print  Route: Oral  
 methadone (DOLOPHINE) 10 mg tablet 0 Starting on: 12/24/2017 Sig: Take 2 Tabs by mouth four (4) times daily for 30 days. Max Daily Amount: 80 mg. for chronic, severe, refractory pain. Indications: Chronic Pain, Severe Pain Class: Print Route: Oral  
  
Follow-up Instructions Return in about 3 months (around 1/13/2018). Patient Instructions Plan: 
Continue same medications as prescribed for chronic pain Follow up in 3 months or sooner if needed Regular exercise and attention to emotional health and diet remain the most effective ways to treat chronic pain of all kinds You may contact me with questions or concerns through [address] Consider transitioning care to Dr. Gilma Calix at his new practice, with Dr. Shannan Remy in [facility]. [address] Phone: [phone] Fax: [phone] Patient Instructions History Introducing [facility]! Dear Octavio Hagen: Thank you for requesting a mention account. Our records indicate that you already have an active mention account. You can access your account anytime at [email or web address] Did you know that you can access your hospital and ER discharge instructions at any time in mention? You can also review all of your test results from your hospital stay or ER visit. Additional Information If you have questions, please visit the Frequently Asked Questions section of the mention website at [email or web address] Remember, mention is NOT to be used for urgent needs. For medical emergencies, dial 911. Now available from your iPhone and Android! Please provide this summary of care documentation to your next provider. Your primary care clinician is listed as Bert Mc. If you have any questions after today's visit, please call [phone].
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
## 2018-01-01 ENCOUNTER — TELEPHONE (OUTPATIENT)
Dept: PAIN MANAGEMENT | Age: 68
End: 2018-01-01

## 2018-01-01 ENCOUNTER — ANESTHESIA (OUTPATIENT)
Dept: SURGERY | Age: 68
End: 2018-01-01
Payer: MEDICARE

## 2018-01-01 ENCOUNTER — HOSPITAL ENCOUNTER (OUTPATIENT)
Age: 68
Setting detail: OUTPATIENT SURGERY
Discharge: HOME | End: 2018-05-15
Attending: UROLOGY | Admitting: UROLOGY
Payer: MEDICARE

## 2018-01-01 ENCOUNTER — DOCUMENTATION ONLY (OUTPATIENT)
Dept: PAIN MANAGEMENT | Age: 68
End: 2018-01-01

## 2018-01-01 ENCOUNTER — OFFICE VISIT (OUTPATIENT)
Dept: PAIN MANAGEMENT | Age: 68
End: 2018-01-01

## 2018-01-01 ENCOUNTER — APPOINTMENT (OUTPATIENT)
Dept: GENERAL RADIOLOGY | Age: 68
End: 2018-01-01
Attending: UROLOGY
Payer: MEDICARE

## 2018-01-01 ENCOUNTER — ANESTHESIA EVENT (OUTPATIENT)
Dept: SURGERY | Age: 68
End: 2018-01-01
Payer: MEDICARE

## 2018-01-01 VITALS
HEIGHT: 69 IN | BODY MASS INDEX: 23.99 KG/M2 | SYSTOLIC BLOOD PRESSURE: 102 MMHG | WEIGHT: 162 LBS | HEART RATE: 74 BPM | TEMPERATURE: 98.6 F | RESPIRATION RATE: 18 BRPM | DIASTOLIC BLOOD PRESSURE: 54 MMHG

## 2018-01-01 VITALS
TEMPERATURE: 98.2 F | RESPIRATION RATE: 18 BRPM | WEIGHT: 162 LBS | BODY MASS INDEX: 23.99 KG/M2 | SYSTOLIC BLOOD PRESSURE: 131 MMHG | HEIGHT: 69 IN | HEART RATE: 60 BPM | DIASTOLIC BLOOD PRESSURE: 64 MMHG

## 2018-01-01 VITALS
WEIGHT: 166.44 LBS | TEMPERATURE: 97.3 F | BODY MASS INDEX: 24.65 KG/M2 | OXYGEN SATURATION: 100 % | RESPIRATION RATE: 18 BRPM | SYSTOLIC BLOOD PRESSURE: 131 MMHG | HEART RATE: 56 BPM | HEIGHT: 69 IN | DIASTOLIC BLOOD PRESSURE: 62 MMHG

## 2018-01-01 VITALS
SYSTOLIC BLOOD PRESSURE: 119 MMHG | HEIGHT: 69 IN | BODY MASS INDEX: 24.59 KG/M2 | DIASTOLIC BLOOD PRESSURE: 55 MMHG | HEART RATE: 62 BPM | WEIGHT: 166 LBS | TEMPERATURE: 97.2 F | RESPIRATION RATE: 14 BRPM

## 2018-01-01 VITALS
RESPIRATION RATE: 18 BRPM | HEIGHT: 69 IN | WEIGHT: 166 LBS | TEMPERATURE: 98.6 F | BODY MASS INDEX: 24.59 KG/M2 | DIASTOLIC BLOOD PRESSURE: 55 MMHG | HEART RATE: 62 BPM | SYSTOLIC BLOOD PRESSURE: 119 MMHG

## 2018-01-01 DIAGNOSIS — Z79.899 ENCOUNTER FOR LONG-TERM (CURRENT) USE OF HIGH-RISK MEDICATION: ICD-10-CM

## 2018-01-01 DIAGNOSIS — G89.4 CHRONIC PAIN SYNDROME: ICD-10-CM

## 2018-01-01 DIAGNOSIS — Z89.612 STATUS POST ABOVE KNEE AMPUTATION OF LEFT LOWER EXTREMITY: ICD-10-CM

## 2018-01-01 DIAGNOSIS — G54.6 PHANTOM LIMB PAIN (HCC): Primary | ICD-10-CM

## 2018-01-01 DIAGNOSIS — M79.2 NEURALGIA: ICD-10-CM

## 2018-01-01 DIAGNOSIS — G54.6 PHANTOM LIMB PAIN (HCC): ICD-10-CM

## 2018-01-01 DIAGNOSIS — M79.605 PAIN OF LEFT LOWER EXTREMITY: ICD-10-CM

## 2018-01-01 DIAGNOSIS — Z79.899 ENCOUNTER FOR LONG-TERM (CURRENT) DRUG USE: ICD-10-CM

## 2018-01-01 DIAGNOSIS — I73.9 PAD (PERIPHERAL ARTERY DISEASE) (HCC): ICD-10-CM

## 2018-01-01 DIAGNOSIS — M79.2 NEUROPATHIC PAIN: ICD-10-CM

## 2018-01-01 DIAGNOSIS — N13.30 BILATERAL HYDRONEPHROSIS: Primary | ICD-10-CM

## 2018-01-01 LAB
ALCOHOL UR POC: NORMAL
AMPHETAMINES UR POC: NEGATIVE
BARBITURATES UR POC: NORMAL
BENZODIAZEPINES UR POC: NEGATIVE
BUPRENORPHINE UR POC: NEGATIVE
CANNABINOIDS UR POC: NEGATIVE
CARISOPRODOL UR POC: NORMAL
COCAINE UR POC: NEGATIVE
FENTANYL UR POC: NORMAL
MDMA/ECSTASY UR POC: NORMAL
METHADONE UR POC: NORMAL
METHAMPHETAMINE UR POC: NORMAL
METHYLPHENIDATE UR POC: NORMAL
OPIATES UR POC: NEGATIVE
OXYCODONE UR POC: NORMAL
PHENCYCLIDINE UR POC: NORMAL
PROPOXYPHENE UR POC: NORMAL
TRAMADOL UR POC: NORMAL
TRICYCLICS UR POC: NORMAL

## 2018-01-01 PROCEDURE — 77030012508 HC MSK AIRWY LMA AMBU -A: Performed by: ANESTHESIOLOGY

## 2018-01-01 PROCEDURE — C1758 CATHETER, URETERAL: HCPCS | Performed by: UROLOGY

## 2018-01-01 PROCEDURE — 77010033678 HC OXYGEN DAILY

## 2018-01-01 PROCEDURE — 74011636320 HC RX REV CODE- 636/320: Performed by: UROLOGY

## 2018-01-01 PROCEDURE — C2617 STENT, NON-COR, TEM W/O DEL: HCPCS | Performed by: UROLOGY

## 2018-01-01 PROCEDURE — 76010000138 HC OR TIME 0.5 TO 1 HR: Performed by: UROLOGY

## 2018-01-01 PROCEDURE — 74011250636 HC RX REV CODE- 250/636: Performed by: UROLOGY

## 2018-01-01 PROCEDURE — 77030014023 HC SYR INFL LVEEN BSC -B: Performed by: UROLOGY

## 2018-01-01 PROCEDURE — 74011250636 HC RX REV CODE- 250/636

## 2018-01-01 PROCEDURE — C1894 INTRO/SHEATH, NON-LASER: HCPCS | Performed by: UROLOGY

## 2018-01-01 PROCEDURE — C1769 GUIDE WIRE: HCPCS | Performed by: UROLOGY

## 2018-01-01 PROCEDURE — 74420 UROGRAPHY RTRGR +-KUB: CPT

## 2018-01-01 PROCEDURE — 76060000032 HC ANESTHESIA 0.5 TO 1 HR: Performed by: UROLOGY

## 2018-01-01 PROCEDURE — 76210000021 HC REC RM PH II 0.5 TO 1 HR: Performed by: UROLOGY

## 2018-01-01 PROCEDURE — C1726 CATH, BAL DIL, NON-VASCULAR: HCPCS | Performed by: UROLOGY

## 2018-01-01 PROCEDURE — 77030018836 HC SOL IRR NACL ICUM -A: Performed by: UROLOGY

## 2018-01-01 PROCEDURE — 74011000250 HC RX REV CODE- 250

## 2018-01-01 PROCEDURE — 74011000272 HC RX REV CODE- 272: Performed by: UROLOGY

## 2018-01-01 PROCEDURE — 76210000006 HC OR PH I REC 0.5 TO 1 HR: Performed by: UROLOGY

## 2018-01-01 PROCEDURE — 77030019927 HC TBNG IRR CYSTO BAXT -A: Performed by: UROLOGY

## 2018-01-01 DEVICE — URETERAL STENT
Type: IMPLANTABLE DEVICE | Site: URETER | Status: FUNCTIONAL
Brand: POLARIS™ ULTRA

## 2018-01-01 RX ORDER — DIPHENHYDRAMINE HYDROCHLORIDE 50 MG/ML
12.5 INJECTION, SOLUTION INTRAMUSCULAR; INTRAVENOUS
Status: DISCONTINUED | OUTPATIENT
Start: 2018-01-01 | End: 2018-01-01 | Stop reason: HOSPADM

## 2018-01-01 RX ORDER — METHADONE HYDROCHLORIDE 10 MG/1
20 TABLET ORAL 4 TIMES DAILY
Qty: 240 TAB | Refills: 0 | Status: SHIPPED | OUTPATIENT
Start: 2018-01-01 | End: 2018-01-01 | Stop reason: SDUPTHER

## 2018-01-01 RX ORDER — HYDROCODONE BITARTRATE AND ACETAMINOPHEN 5; 325 MG/1; MG/1
1 TABLET ORAL AS NEEDED
Status: DISCONTINUED | OUTPATIENT
Start: 2018-01-01 | End: 2018-01-01 | Stop reason: HOSPADM

## 2018-01-01 RX ORDER — PROPOFOL 10 MG/ML
INJECTION, EMULSION INTRAVENOUS AS NEEDED
Status: DISCONTINUED | OUTPATIENT
Start: 2018-01-01 | End: 2018-01-01 | Stop reason: HOSPADM

## 2018-01-01 RX ORDER — FENTANYL CITRATE 50 UG/ML
25 INJECTION, SOLUTION INTRAMUSCULAR; INTRAVENOUS AS NEEDED
Status: DISCONTINUED | OUTPATIENT
Start: 2018-01-01 | End: 2018-01-01 | Stop reason: HOSPADM

## 2018-01-01 RX ORDER — OXYCODONE HYDROCHLORIDE 15 MG/1
15 TABLET ORAL
Qty: 120 TAB | Refills: 0 | Status: SHIPPED | OUTPATIENT
Start: 2018-01-01 | End: 2018-01-01 | Stop reason: SDUPTHER

## 2018-01-01 RX ORDER — TRAMADOL HYDROCHLORIDE 50 MG/1
50 TABLET ORAL
Qty: 15 TAB | Refills: 0 | Status: SHIPPED | OUTPATIENT
Start: 2018-01-01 | End: 2018-01-01

## 2018-01-01 RX ORDER — ONDANSETRON 2 MG/ML
4 INJECTION INTRAMUSCULAR; INTRAVENOUS ONCE
Status: DISCONTINUED | OUTPATIENT
Start: 2018-01-01 | End: 2018-01-01 | Stop reason: HOSPADM

## 2018-01-01 RX ORDER — OXYCODONE HYDROCHLORIDE 15 MG/1
15 TABLET ORAL
Qty: 120 TAB | Refills: 0 | Status: SHIPPED | OUTPATIENT
Start: 2018-01-01 | End: 2018-01-01

## 2018-01-01 RX ORDER — SODIUM CHLORIDE, SODIUM LACTATE, POTASSIUM CHLORIDE, CALCIUM CHLORIDE 600; 310; 30; 20 MG/100ML; MG/100ML; MG/100ML; MG/100ML
150 INJECTION, SOLUTION INTRAVENOUS CONTINUOUS
Status: DISCONTINUED | OUTPATIENT
Start: 2018-01-01 | End: 2018-01-01 | Stop reason: HOSPADM

## 2018-01-01 RX ORDER — MAGNESIUM SULFATE 100 %
4 CRYSTALS MISCELLANEOUS AS NEEDED
Status: DISCONTINUED | OUTPATIENT
Start: 2018-01-01 | End: 2018-01-01 | Stop reason: HOSPADM

## 2018-01-01 RX ORDER — MIDAZOLAM HYDROCHLORIDE 1 MG/ML
INJECTION, SOLUTION INTRAMUSCULAR; INTRAVENOUS AS NEEDED
Status: DISCONTINUED | OUTPATIENT
Start: 2018-01-01 | End: 2018-01-01 | Stop reason: HOSPADM

## 2018-01-01 RX ORDER — ALBUTEROL SULFATE 0.83 MG/ML
2.5 SOLUTION RESPIRATORY (INHALATION) AS NEEDED
Status: DISCONTINUED | OUTPATIENT
Start: 2018-01-01 | End: 2018-01-01 | Stop reason: HOSPADM

## 2018-01-01 RX ORDER — METHADONE HYDROCHLORIDE 10 MG/1
20 TABLET ORAL 4 TIMES DAILY
Qty: 240 TAB | Refills: 0 | Status: SHIPPED | OUTPATIENT
Start: 2018-01-01 | End: 2018-01-01

## 2018-01-01 RX ORDER — CEPHALEXIN 500 MG/1
500 CAPSULE ORAL 3 TIMES DAILY
Qty: 15 CAP | Refills: 0 | Status: SHIPPED | OUTPATIENT
Start: 2018-01-01 | End: 2018-01-01 | Stop reason: ALTCHOICE

## 2018-01-01 RX ORDER — SODIUM CHLORIDE, SODIUM LACTATE, POTASSIUM CHLORIDE, CALCIUM CHLORIDE 600; 310; 30; 20 MG/100ML; MG/100ML; MG/100ML; MG/100ML
125 INJECTION, SOLUTION INTRAVENOUS CONTINUOUS
Status: DISCONTINUED | OUTPATIENT
Start: 2018-01-01 | End: 2018-01-01 | Stop reason: HOSPADM

## 2018-01-01 RX ORDER — DEXAMETHASONE SODIUM PHOSPHATE 4 MG/ML
INJECTION, SOLUTION INTRA-ARTICULAR; INTRALESIONAL; INTRAMUSCULAR; INTRAVENOUS; SOFT TISSUE AS NEEDED
Status: DISCONTINUED | OUTPATIENT
Start: 2018-01-01 | End: 2018-01-01 | Stop reason: HOSPADM

## 2018-01-01 RX ORDER — LIDOCAINE 50 MG/G
1 PATCH TOPICAL EVERY 24 HOURS
Qty: 30 PATCH | Refills: 0 | Status: SHIPPED | OUTPATIENT
Start: 2018-01-01 | End: 2018-01-01

## 2018-01-01 RX ORDER — SODIUM CHLORIDE 0.9 % (FLUSH) 0.9 %
5-10 SYRINGE (ML) INJECTION AS NEEDED
Status: DISCONTINUED | OUTPATIENT
Start: 2018-01-01 | End: 2018-01-01 | Stop reason: HOSPADM

## 2018-01-01 RX ORDER — EPHEDRINE SULFATE/0.9% NACL/PF 25 MG/5 ML
SYRINGE (ML) INTRAVENOUS AS NEEDED
Status: DISCONTINUED | OUTPATIENT
Start: 2018-01-01 | End: 2018-01-01 | Stop reason: HOSPADM

## 2018-01-01 RX ORDER — METHADONE HYDROCHLORIDE 10 MG/1
TABLET ORAL
Qty: 210 TAB | Refills: 0 | Status: SHIPPED | OUTPATIENT
Start: 2018-01-01 | End: 2018-01-01 | Stop reason: SDUPTHER

## 2018-01-01 RX ORDER — ONDANSETRON 2 MG/ML
INJECTION INTRAMUSCULAR; INTRAVENOUS AS NEEDED
Status: DISCONTINUED | OUTPATIENT
Start: 2018-01-01 | End: 2018-01-01 | Stop reason: HOSPADM

## 2018-01-01 RX ORDER — LIDOCAINE HYDROCHLORIDE 20 MG/ML
INJECTION, SOLUTION EPIDURAL; INFILTRATION; INTRACAUDAL; PERINEURAL AS NEEDED
Status: DISCONTINUED | OUTPATIENT
Start: 2018-01-01 | End: 2018-01-01 | Stop reason: HOSPADM

## 2018-01-01 RX ORDER — NALOXONE HYDROCHLORIDE 0.4 MG/ML
0.1 INJECTION, SOLUTION INTRAMUSCULAR; INTRAVENOUS; SUBCUTANEOUS AS NEEDED
Status: DISCONTINUED | OUTPATIENT
Start: 2018-01-01 | End: 2018-01-01 | Stop reason: HOSPADM

## 2018-01-01 RX ORDER — METHADONE HYDROCHLORIDE 10 MG/1
TABLET ORAL
Qty: 180 TAB | Refills: 0 | Status: SHIPPED | OUTPATIENT
Start: 2018-01-01

## 2018-01-01 RX ORDER — INSULIN LISPRO 100 [IU]/ML
INJECTION, SOLUTION INTRAVENOUS; SUBCUTANEOUS ONCE
Status: DISCONTINUED | OUTPATIENT
Start: 2018-01-01 | End: 2018-01-01 | Stop reason: HOSPADM

## 2018-01-01 RX ORDER — DEXTROSE 50 % IN WATER (D50W) INTRAVENOUS SYRINGE
25-50 AS NEEDED
Status: DISCONTINUED | OUTPATIENT
Start: 2018-01-01 | End: 2018-01-01 | Stop reason: HOSPADM

## 2018-01-01 RX ORDER — OXYCODONE HYDROCHLORIDE 5 MG/1
5 TABLET ORAL ONCE
Status: DISCONTINUED | OUTPATIENT
Start: 2018-01-01 | End: 2018-01-01 | Stop reason: HOSPADM

## 2018-01-01 RX ORDER — CEFAZOLIN SODIUM/WATER 2 G/20 ML
2 SYRINGE (ML) INTRAVENOUS ONCE
Status: COMPLETED | OUTPATIENT
Start: 2018-01-01 | End: 2018-01-01

## 2018-01-01 RX ORDER — METHADONE HYDROCHLORIDE 10 MG/1
20 TABLET ORAL 4 TIMES DAILY
Qty: 240 TAB | Refills: 0 | Status: ON HOLD | OUTPATIENT
Start: 2018-01-01 | End: 2018-01-01 | Stop reason: SDUPTHER

## 2018-01-01 RX ORDER — OXYCODONE HYDROCHLORIDE 15 MG/1
15 TABLET ORAL
Qty: 120 TAB | Refills: 0 | Status: SHIPPED | OUTPATIENT
Start: 2018-01-01 | End: 2018-10-27

## 2018-01-01 RX ADMIN — Medication 10 MG: at 09:04

## 2018-01-01 RX ADMIN — ONDANSETRON 4 MG: 2 INJECTION INTRAMUSCULAR; INTRAVENOUS at 09:06

## 2018-01-01 RX ADMIN — MIDAZOLAM HYDROCHLORIDE 2 MG: 1 INJECTION, SOLUTION INTRAMUSCULAR; INTRAVENOUS at 08:51

## 2018-01-01 RX ADMIN — Medication 10 MG: at 09:14

## 2018-01-01 RX ADMIN — LIDOCAINE HYDROCHLORIDE 100 MG: 20 INJECTION, SOLUTION EPIDURAL; INFILTRATION; INTRACAUDAL; PERINEURAL at 08:58

## 2018-01-01 RX ADMIN — DEXAMETHASONE SODIUM PHOSPHATE 4 MG: 4 INJECTION, SOLUTION INTRA-ARTICULAR; INTRALESIONAL; INTRAMUSCULAR; INTRAVENOUS; SOFT TISSUE at 09:06

## 2018-01-01 RX ADMIN — Medication 10 MG: at 08:51

## 2018-01-01 RX ADMIN — SODIUM CHLORIDE, SODIUM LACTATE, POTASSIUM CHLORIDE, AND CALCIUM CHLORIDE 125 ML/HR: 600; 310; 30; 20 INJECTION, SOLUTION INTRAVENOUS at 09:57

## 2018-01-01 RX ADMIN — PROPOFOL 150 MG: 10 INJECTION, EMULSION INTRAVENOUS at 08:58

## 2018-01-01 RX ADMIN — Medication 2 G: at 09:04

## 2018-01-01 RX ADMIN — SODIUM CHLORIDE, SODIUM LACTATE, POTASSIUM CHLORIDE, AND CALCIUM CHLORIDE 125 ML/HR: 600; 310; 30; 20 INJECTION, SOLUTION INTRAVENOUS at 07:21

## 2018-01-10 PROBLEM — F33.9 RECURRENT DEPRESSION (HCC): Status: ACTIVE | Noted: 2018-01-01

## 2018-01-10 NOTE — PROGRESS NOTES
HISTORY OF PRESENT ILLNESS  Robbi Olszewski is a 78 y.o. female    Ms. Mcduffie returns for followup of chronic, severe pain which includes phantom pain status post left AKA as well as chronic bilateral leg pain due to severe PAD. She also endorses widespread myofascial pain, anxiety and poor sleep, likely attributable to fibromyalgia. This is my first visit with this patient today. The patient denies any significant changes since last f/u. This patient presents with her  today. She continues to have constant pain in multiple locations, including the left stump, right hip, foot and hand, as well as the lower back and neck. We discussed her current condition and medications in detail today. She tolerates medications without side effects. Robbi Olszewski reports no change in sleep or constipation. Current medication management consists of:Refill oxycodone IR 50 mg tablet, take 1 tablet 4 times daily as needed, methadone 10 mg tablet, take 2 tablets by mouth 4 times daily  Medications are helping with pain control and quality of life. Her pain is 3-4/10 with medication and 9-10/10 without. Pt describes pain as aching, stabbing, and burning. Aggravating factors include standing, sitting, and walking. Relieved with rest, medication, and avoiding painful activities. The patient reports 60% relief with current medications. Current treatment is helping to improve general activity, mood, walking, sleep, enjoyment of life    Measuring clinical outcomes of chronic pain patients: score 12/28; the lower the number the better the outcome. Pain Meds and Quality Of Life have been reviewed. Nonpharmacologic therapy and non-opioid pharmacologic therapy were considered. If opioid therapy is prescribed, this is only if the expected benefits are anticipated to outweigh risks. She  is otherwise doing well with no other complaints today.  She denies any adverse events including nausea, vomiting, dizziness, increased constipation, hallucinations, or seizures. The patient reports functional improvement and QOL with pain medication. Vitals:    01/10/18 1356   BP: 131/64   Pulse: 60   Resp: 18   Temp: 98.2 °F (36.8 °C)   TempSrc: Oral   Weight: 73.5 kg (162 lb)   Height: 5' 9\" (1.753 m)   PainSc:   8   PainLoc: Leg         Allergies   Allergen Reactions    Heparin Analogues Other (comments)     Opposite reaction to heparin    Iodinated Contrast- Oral And Iv Dye Hives       Past Surgical History:   Procedure Laterality Date    HX AAA REPAIR  2012    using bifurcation graft    HX ABOVE KNEE AMPUTATION Left 2012    HX CERVICAL LAMINECTOMY      HX CHOLECYSTECTOMY      HX GYN      hysterectomy    HX SALPINGO-OOPHORECTOMY      HX UROLOGICAL  2014, 2015    Cysto    HX UROLOGICAL      stents       ROS   Constitutional: Negative for weight loss. Respiratory: Negative for shortness of breath. Gastrointestinal: Negative for constipation. Musculoskeletal: Positive for myalgias, back pain, joint pain and neck pain. Negative for falls. Skin: Negative for rash. Neurological: Positive for sensory change. Memory loss     Physical Exam   Constitutional: She is oriented to person, place, and time. She appears well-developed and well-nourished. She is cooperative. No distress. HENT:   Head: Normocephalic and atraumatic. Pulmonary/Chest: Effort normal. No respiratory distress. Musculoskeletal:        Right upper leg: She exhibits deformity (AKA). Neurological: She is alert and oriented to person, place, and time. Gait abnormal.   Skin: Skin is warm and dry. She is not diaphoretic. Psychiatric: She has a normal mood and affect. Her speech is normal and behavior is normal. Thought content normal.     ASSESSMENT:    1. Phantom limb pain ([address]    2. Neuralgia    3. PAD (peripheral artery disease) ([address]    4. Chronic pain syndrome    5. Pain of left lower extremity    6.  Status post above knee amputation of left lower extremity ([facility] [address]    7. Encounter for long-term (current) use of high-risk medication        [place] Prescription Monitoring Program was reviewed which does not demonstrate aberrancies and/or inconsistencies with regard to the historical prescribing of controlled medications to this patient by other providers. Medications were brought to visit today. Pill count was appropriate. When possible, non-drug therapy for chronic pain should be used as a first-line treatment. Physical therapy exercise regimens, chiropractic manipulation, meditation relaxation techniques, cognitive behavior therapy, acupuncture, yoga, Syed Chi,  transcutaneous electrical nerve stimulation (TENS), and application of moist heat can help alleviate pain . Explained that realistic expectations and goals with chronic pain management are to maximize function and minimize pain with the understanding that limitations will exist both in the extent of relief that she may achieve, as well as thresholds of mg strengths that we will not exceed. Our role is to help the patient better cope with chronic pain utilizng a multimodal approach. The patients condition and plan were discussed. All questions were answered. The patient agrees with the plan. PLAN / Pt Instructions:  1. Continue current plan with no evidence of addiction or diversion. 2. Stable on current medication without adverse events. 3. Refill oxycodone IR 50 mg tablet, take 1 tablet 4 times daily as needed  4. Refill methadone 10 mg tablet, take 2 tablets by mouth 4 times daily  5. Discussed risks of addiction, dependency, and opioid induced hyperalgesia. 6. Reviewed with patient benefits of home exercise, and lifestyle changes to assist in self-management of symptoms. 7. Return to clinic in 3 months       Prescription monitoring program reviewed.       Pain medications prescribed with the objective of pain relief and improved physical and psychosocial function in this patient. DISPOSITION  · Counseled patient on proper use of prescribed medications. · Counseled patient about chronic medical conditions and their relationship to anxiety and depression and recommended mental health support as needed. · Reviewed with patient self-help tools, home exercise, and lifestyle changes to assist the patient in self-management of symptoms. · Reviewed with patient the treatment plan, goals of treatment plan, and limitations of treatment plan, to include the potential for side effects from medications and procedures. If side effects occur, it is the responsibility of the patient to inform the clinic so that a change in the treatment plan can be made in a safe manner. The patient is advised that stopping prescribed medication may cause an increase in symptoms and possible medication withdrawal symptoms. The patient is informed an emergency room evaluation may be necessary if this occurs. Spent 25 minutes with patient today reviewing the treatment plan, goals of treatment plan, and limitations of the treatment plan, to include the potential for side effects from medications and procedures. More than 50% of the visit time was spent counseling the patient. Sanju Gutierres PA-C 1/10/2018        Note: Please excuse any typographical errors. Voice recognition software was used for this note and may cause mistakes.

## 2018-01-10 NOTE — PROGRESS NOTES
Nursing Notes    Patient presents to the office today in follow-up. Patient rates her pain at 8/10 on the numerical pain scale. Reviewed medications with counts as follows:    Rx Date filled Qty Dispensed Pill Count Last Dose Short   Oxycodone 15 mg 12/24/17 120 89 This a.m no   Methadone 10 mg 12/24/17 240 183 This a.m no                                POC UDS was not performed in office today    Any new labs or imaging since last appointment? NO    Have you been to an emergency room (ER) or urgent care clinic since your last visit? NO            Have you been hospitalized since your last visit? NO     If yes, where, when, and reason for visit? Have you seen or consulted any other health care providers outside of the [address] [place] [address]  since your last visit? YES, PCP     If yes, where, when, and reason for visit? HM deferred to pcp.

## 2018-01-10 NOTE — MR AVS SNAPSHOT
Visit Information Date & Time Provider Department Dept. Phone Encounter #  
 1/10/2018  1:40 PM Ana Dakota [address] [place] [address] for Pain Management [id number] Follow-up Instructions Return in about 3 months (around 4/10/2018). Upcoming Health Maintenance Date Due Hepatitis C Screening 1950 DTaP/Tdap/Td series (1 - Tdap) 10/30/1972 BREAST CANCER SCRN MAMMOGRAM 10/30/2001 FOBT Q 1 YEAR AGE 50-75 10/30/2001 ZOSTER VACCINE AGE 60> 8/30/2011 GLAUCOMA SCREENING Q2Y 10/30/2016 OSTEOPOROSIS SCREENING (DEXA) 10/30/2016 Pneumococcal 65+ Low/Medium Risk (1 of 2 - PCV13) 10/30/2016 MEDICARE YEARLY EXAM 10/30/2016 Allergies as of 1/10/2018  Review Complete On: 1/10/2018 By: Stef Medina PA-C Severity Noted Reaction Type Reactions Heparin Analogues  08/19/2014   Side Effect Other (comments) Opposite reaction to heparin Iodinated Contrast- Oral And Iv Dye    Hives Current Immunizations  Never Reviewed No immunizations on file. Not reviewed this visit You Were Diagnosed With   
  
 Codes Comments Phantom limb pain ([facility] [address]    -  Primary ICD-10-CM: G54.6 ICD-9-CM: 353.6 Neuralgia     ICD-10-CM: M79.2 ICD-9-CM: 729.2 PAD (peripheral artery disease) (HCC)     ICD-10-CM: I73.9 ICD-9-CM: 443. 9 Chronic pain syndrome     ICD-10-CM: G89.4 ICD-9-CM: 338.4 Pain of left lower extremity     ICD-10-CM: M79.605 ICD-9-CM: 729.5 Status post above knee amputation of left lower extremity ([facility] [address]     ICD-10-CM: U00.738 ICD-9-CM: V49.76 Encounter for long-term (current) use of high-risk medication     ICD-10-CM: Z79.899 ICD-9-CM: V58.69 Vitals BP Pulse Temp Resp Height(growth percentile) Weight(growth percentile) 131/64 (BP 1 Location: Right arm, BP Patient Position: Sitting) 60 98.2 °F (36.8 °C) (Oral) 18 5' 9\" (1.753 m) 162 lb (73.5 kg) BMI OB Status Smoking Status 23.92 kg/m2 Hysterectomy Former Smoker BMI and BSA Data Body Mass Index Body Surface Area  
 23.92 kg/m 2 1.89 m 2 Preferred Pharmacy Pharmacy Name Phone Elvira Alberto [place] - [address] [phone] Your Updated Medication List  
  
   
This list is accurate as of: 1/10/18  2:57 PM.  Always use your most recent med list.  
  
  
  
  
 albuterol 90 mcg/actuation inhaler Commonly known as:  PROVENTIL HFA, VENTOLIN HFA, PROAIR HFA Take 2 Puffs by inhalation as needed for Wheezing. Indications: asthma  
  
 atorvastatin 80 mg tablet Commonly known as:  LIPITOR Take 80 mg by mouth nightly. Indications: HYPERCHOLESTEROLEMIA  
  
 carvedilol 12.5 mg tablet Commonly known as:  Kip Evener Take 12.5 mg by mouth two (2) times daily (with meals). Indications: HYPERTENSION, CHF  
  
 cephALEXin 500 mg capsule Commonly known as:  Keila Boyers Take 1 Cap by mouth three (3) times daily. CORRECTOL 5 mg Tab Generic drug:  bisacodyl Take  by mouth.  
  
 enoxaparin 120 mg/0.8 mL injection Commonly known as:  LOVENOX  
120 mg by SubCUTAneous route daily. furosemide 40 mg tablet Commonly known as:  LASIX Take 40 mg by mouth daily. gabapentin 600 mg tablet Commonly known as:  NEURONTIN Take 600 mg by mouth three (3) times daily. Indications: NEUROPATHIC PAIN Iron 325 mg (65 mg iron) tablet Generic drug:  ferrous sulfate Take  by mouth three (3) times daily (with meals). isosorbide mononitrate ER 60 mg CR tablet Commonly known as:  IMDUR Take 60 mg by mouth every morning. losartan 25 mg tablet Commonly known as:  COZAAR Take 25 mg by mouth daily. Indications: HYPERTENSION  
  
 * methadone 10 mg tablet Commonly known as:  DOLOPHINE Take 2 Tabs by mouth four (4) times daily for 30 days. Max Daily Amount: 80 mg. for chronic, severe, refractory pain. Indications: Chronic Pain, Severe Pain * methadone 10 mg tablet Commonly known as:  DOLOPHINE Take 2 Tabs by mouth four (4) times daily for 30 days. Max Daily Amount: 80 mg. for chronic, severe, refractory pain. Indications: Chronic Pain, Severe Pain Start taking on:  1/23/2018 * methadone 10 mg tablet Commonly known as:  DOLOPHINE Take 2 Tabs by mouth four (4) times daily for 30 days. Max Daily Amount: 80 mg. for chronic, severe, refractory pain. Indications: Chronic Pain, Severe Pain Start taking on:  2/22/2018 * methadone 10 mg tablet Commonly known as:  DOLOPHINE Take 2 Tabs by mouth four (4) times daily for 30 days. Max Daily Amount: 80 mg. for chronic, severe, refractory pain. Indications: Chronic Pain, Severe Pain Start taking on:  3/21/2018  
  
 naloxone 4 mg/actuation nasal spray Commonly known as:  NARCAN  
4 mg by Nasal route once as needed (opioid overdose) for up to 1 dose. May substitute 2 mg syringe if insurance requires  
  
 omeprazole 20 mg capsule Commonly known as:  PRILOSEC Take 20 mg by mouth daily. Indications: GASTROESOPHAGEAL REFLUX  
  
 * oxyCODONE IR 15 mg immediate release tablet Commonly known as:  Cleopatra Vargasry Take 1 Tab by mouth four (4) times daily as needed for Pain for up to 30 days. Max Daily Amount: 4 Tabs. May take 1/2 to 1 tab QID prn breakthrough pain  Indications: NEUROPATHIC PAIN, Pain * oxyCODONE IR 15 mg immediate release tablet Commonly known as:  Cleopatra Mian Take 1 Tab by mouth four (4) times daily as needed for Pain for up to 30 days. Max Daily Amount: 4 Tabs. May take 1/2 to 1 tab QID prn breakthrough pain  Indications: NEUROPATHIC PAIN, Pain Start taking on:  1/23/2018 * oxyCODONE IR 15 mg immediate release tablet Commonly known as:  Cleopatra Mian Take 1 Tab by mouth four (4) times daily as needed for Pain for up to 30 days. Max Daily Amount: 4 Tabs. May take 1/2 to 1 tab QID prn breakthrough pain  Indications: NEUROPATHIC PAIN, Pain Start taking on:  2/22/2018 * oxyCODONE IR 15 mg immediate release tablet Commonly known as:  Elke Ashton Take 1 Tab by mouth four (4) times daily as needed for Pain for up to 30 days. Max Daily Amount: 4 Tabs. May take 1/2 to 1 tab QID prn breakthrough pain  Indications: NEUROPATHIC PAIN, Pain Start taking on:  3/21/2018  
  
 polyvinyl alcohol 1.4 % ophthalmic solution Commonly known as:  Sony Guppy Administer 1 Drop to both eyes as needed. potassium chloride 20 mEq packet Commonly known as:  KLOR-CON Take 20 mEq by mouth two (2) times a day. Indications: HYPOKALEMIA PREVENTION  
  
 senna-docusate 8.6-50 mg per tablet Commonly known as:  Lakisha Ba Take 1 Tab by mouth as needed for Constipation. Alternates with Docusate sodium. venlafaxine- mg capsule Commonly known as:  EFFEXOR-XR  
TAKE 1 CAPSULE BY MOUTH DAILY FOR 30 DAYS  
  
 XARELTO 10 mg tablet Generic drug:  rivaroxaban Take  by mouth daily. * Notice: This list has 8 medication(s) that are the same as other medications prescribed for you. Read the directions carefully, and ask your doctor or other care provider to review them with you. Prescriptions Printed Refills  
 oxyCODONE IR (ROXICODONE) 15 mg immediate release tablet 0 Starting on: 3/21/2018 Sig: Take 1 Tab by mouth four (4) times daily as needed for Pain for up to 30 days. Max Daily Amount: 4 Tabs. May take 1/2 to 1 tab QID prn breakthrough pain  Indications: NEUROPATHIC PAIN, Pain Class: Print Route: Oral  
 oxyCODONE IR (ROXICODONE) 15 mg immediate release tablet 0 Starting on: 2/22/2018 Sig: Take 1 Tab by mouth four (4) times daily as needed for Pain for up to 30 days. Max Daily Amount: 4 Tabs. May take 1/2 to 1 tab QID prn breakthrough pain  Indications: NEUROPATHIC PAIN, Pain Class: Print Route: Oral  
 oxyCODONE IR (ROXICODONE) 15 mg immediate release tablet 0 Starting on: 1/23/2018 Sig: Take 1 Tab by mouth four (4) times daily as needed for Pain for up to 30 days. Max Daily Amount: 4 Tabs. May take 1/2 to 1 tab QID prn breakthrough pain  Indications: NEUROPATHIC PAIN, Pain Class: Print Route: Oral  
 methadone (DOLOPHINE) 10 mg tablet 0 Starting on: 3/21/2018 Sig: Take 2 Tabs by mouth four (4) times daily for 30 days. Max Daily Amount: 80 mg. for chronic, severe, refractory pain. Indications: Chronic Pain, Severe Pain Class: Print Route: Oral  
 methadone (DOLOPHINE) 10 mg tablet 0 Starting on: 2/22/2018 Sig: Take 2 Tabs by mouth four (4) times daily for 30 days. Max Daily Amount: 80 mg. for chronic, severe, refractory pain. Indications: Chronic Pain, Severe Pain Class: Print Route: Oral  
 methadone (DOLOPHINE) 10 mg tablet 0 Starting on: 1/23/2018 Sig: Take 2 Tabs by mouth four (4) times daily for 30 days. Max Daily Amount: 80 mg. for chronic, severe, refractory pain. Indications: Chronic Pain, Severe Pain Class: Print Route: Oral  
  
Follow-up Instructions Return in about 3 months (around 4/10/2018). To-Do List   
 03/30/2018 ECG:  EKG, 12 LEAD, INITIAL Introducing [facility]! Dear King Polanco: Thank you for requesting a [employer] account. Our records indicate that you already have an active [employer] account. You can access your account anytime at [email or web address] Did you know that you can access your hospital and ER discharge instructions at any time in iRidge? You can also review all of your test results from your hospital stay or ER visit. Additional Information If you have questions, please visit the Frequently Asked Questions section of the [employer] website at [email or web address] Remember, [employer] is NOT to be used for urgent needs. For medical emergencies, dial 911. Now available from your iPhone and Android! Please provide this summary of care documentation to your next provider. Your primary care clinician is listed as Maxime Hines. If you have any questions after today's visit, please call [phone].

## 2018-04-09 NOTE — PROGRESS NOTES
HISTORY OF PRESENT ILLNESS  Shelton Alcantara is a 78 y.o. female    Ms. Mcduffie returns for followup of chronic pain which includes phantom pain status post left AKA as well as chronic bilateral leg pain due to severe PAD. She also endorses widespread myofascial pain, anxiety and poor sleep, likely attributable to fibromyalgia.     The patient denies any significant changes in her chronic pain since last f/u. She continues to have constant pain in multiple locations, including the left stump, right hip, foot and hand, as well as the lower back and neck. We discussed her current condition and medications in detail today. She tolerates medications without side effects. Dunia Mcduffie reports no change in sleep or constipation.      Last EK18  QT/QTc: 431/443    Current medication management consists of:Refill oxycodone IR 50 mg tablet, take 1 tablet 4 times daily as needed, methadone 10 mg tablet, take 2 tablets by mouth 4 times daily  Medications are helping with pain control and quality of life. Her pain is 3-4/10 with medication and 9-10/10 without.  Pt describes pain as aching, stabbing, and burning. Aggravating factors include standing, sitting, and walking. Relieved with rest, medication, and avoiding painful activities. The patient reports 60% relief with current medications.   Current treatment is helping to improve general activity, mood, walking, sleep, enjoyment of life    Measuring clinical outcomes of chronic pain patients: score 11/28; the lower the number the better the outcome. Pain Meds and Quality Of Life have been reviewed. Nonpharmacologic therapy and non-opioid pharmacologic therapy were considered. If opioid therapy is prescribed, this is only if the expected benefits are anticipated to outweigh risks. She  is otherwise doing well with no other complaints today.  She denies any adverse events including nausea, vomiting, dizziness, increased constipation, hallucinations, or seizures. The patient reports functional improvement and QOL with pain medication. Vitals:    04/09/18 1511   BP: 102/54   Pulse: 74   Resp: 18   Temp: 98.6 °F (37 °C)   TempSrc: Oral   Weight: 73.5 kg (162 lb)   Height: 5' 9\" (1.753 m)   PainSc:   6   PainLoc: Leg         Allergies   Allergen Reactions    Heparin Analogues Other (comments)     Opposite reaction to heparin    Iodinated Contrast- Oral And Iv Dye Hives       Past Surgical History:   Procedure Laterality Date    HX AAA REPAIR  2012    using bifurcation graft    HX ABOVE KNEE AMPUTATION Left 2012    HX CERVICAL LAMINECTOMY      HX CHOLECYSTECTOMY      HX GYN      hysterectomy    HX SALPINGO-OOPHORECTOMY      HX UROLOGICAL  2014, 2015    Cysto    HX UROLOGICAL      stents       ROS   Constitutional: Negative for weight loss. Respiratory: Negative for shortness of breath.    Gastrointestinal: Negative for constipation. Musculoskeletal: Positive for myalgias, back pain, joint pain and neck pain. Negative for falls. Skin: Negative for rash. Physical Exam   Constitutional: She is oriented to person, place, and time. She appears well-developed and well-nourished. She is cooperative. No distress. HENT:   Head: Normocephalic and atraumatic. Pulmonary/Chest: Effort normal. No respiratory distress. Musculoskeletal:        Right upper leg: She exhibits deformity (AKA). Neurological: She is alert and oriented to person, place, and time. Gait abnormal.   Skin: Skin is warm and dry. She is not diaphoretic. Psychiatric: She has a normal mood and affect. Her speech is normal and behavior is normal. Thought content normal.     ASSESSMENT:    1. Phantom limb pain ([address]    2. Neuralgia    3. Chronic pain syndrome    4. Pain of left lower extremity    5. PAD (peripheral artery disease) ([address]    6.  Encounter for long-term (current) drug use          [place] Prescription Monitoring Program was reviewed which does not demonstrate aberrancies and/or inconsistencies with regard to the historical prescribing of controlled medications to this patient by other providers. Medications were brought to visit today. Pill count was appropriate. When possible, non-drug therapy for chronic pain should be used as a first-line treatment. Physical therapy exercise regimens, chiropractic manipulation, meditation relaxation techniques, cognitive behavior therapy, acupuncture, yoga, Syed Chi,  transcutaneous electrical nerve stimulation (TENS), and application of moist heat can help alleviate pain . Explained that realistic expectations and goals with chronic pain management are to maximize function and minimize pain with the understanding that limitations will exist both in the extent of relief that she may achieve, as well as thresholds of mg strengths that we will not exceed. Our role is to help the patient better cope with chronic pain utilizng a multimodal approach. The patients condition and plan were discussed. All questions were answered. The patient agrees with the plan. PLAN / Pt Instructions:  1. Continue current plan with no evidence of addiction or diversion. 2. Stable on current medication without adverse events. 3. Refill oxycodone IR 15 mg tablet, take 1 tablet 4 times daily as needed  4. Refill methadone 10 mg tablet, take 2 tablets by mouth 4 times daily  5. Discussed risks of addiction, dependency, and opioid induced hyperalgesia. 6. Reviewed with patient benefits of home exercise, and lifestyle changes to assist in self-management of symptoms. 7. Return to clinic in 3 months       Prescription monitoring program reviewed. POC UDS today. Pain medications prescribed with the objective of pain relief and improved physical and psychosocial function in this patient. DISPOSITION  · Counseled patient on proper use of prescribed medications.   · Counseled patient about chronic medical conditions and their relationship to anxiety and depression and recommended mental health support as needed. · Reviewed with patient self-help tools, home exercise, and lifestyle changes to assist the patient in self-management of symptoms. · Reviewed with patient the treatment plan, goals of treatment plan, and limitations of treatment plan, to include the potential for side effects from medications and procedures. If side effects occur, it is the responsibility of the patient to inform the clinic so that a change in the treatment plan can be made in a safe manner. The patient is advised that stopping prescribed medication may cause an increase in symptoms and possible medication withdrawal symptoms. The patient is informed an emergency room evaluation may be necessary if this occurs. Spent 25 minutes with patient today reviewing the treatment plan, goals of treatment plan, and limitations of the treatment plan, to include the potential for side effects from medications and procedures. More than 50% of the visit time was spent counseling the patient. Kaylynn Yates PA-C 4/9/2018        Note: Please excuse any typographical errors. Voice recognition software was used for this note and may cause mistakes.

## 2018-04-09 NOTE — PROGRESS NOTES
Nursing Notes    Patient presents to the office today in follow-up. Patient rates her pain at 6/10 on the numerical pain scale. Reviewed medications with counts as follows:    Rx Date filled Qty Dispensed Pill Count Last Dose Short   Oxycodone 15 mg 03/26/18 120 74 today no   Methadone 10 mg 03/26/18 240 148 today no                                POC UDS was performed in office today    Any new labs or imaging since last appointment? NO    Have you been to an emergency room (ER) or urgent care clinic since your last visit? NO            Have you been hospitalized since your last visit? NO     If yes, where, when, and reason for visit? Have you seen or consulted any other health care providers outside of the [address] [place] [address]  since your last visit? YES, PCP     If yes, where, when, and reason for visit? HM deferred to pcp.

## 2018-04-09 NOTE — MR AVS SNAPSHOT
[address] [address] 
[phone] Patient: Shelton Alcantara MRN: [id number] XAK:[id number] Visit Information Date & Time Provider Department Dept. Phone Encounter #  
 4/9/2018  2:40 PM [facility] [phone] [id number] Upcoming Health Maintenance Date Due Hepatitis C Screening 1950 DTaP/Tdap/Td series (1 - Tdap) 10/30/1972 BREAST CANCER SCRN MAMMOGRAM 10/30/2001 FOBT Q 1 YEAR AGE 50-75 10/30/2001 ZOSTER VACCINE AGE 60> 8/30/2011 GLAUCOMA SCREENING Q2Y 10/30/2016 Bone Densitometry (Dexa) Screening 10/30/2016 Pneumococcal 65+ Low/Medium Risk (1 of 2 - PCV13) 10/30/2016 MEDICARE YEARLY EXAM 3/14/2018 Allergies as of 4/9/2018  Review Complete On: 4/9/2018 By: Samina Tobar Severity Noted Reaction Type Reactions Heparin Analogues  08/19/2014   Side Effect Other (comments) Opposite reaction to heparin Iodinated Contrast- Oral And Iv Dye    Hives Current Immunizations  Never Reviewed No immunizations on file. Not reviewed this visit You Were Diagnosed With   
  
 Codes Comments Phantom limb pain ([facility] [address]    -  Primary ICD-10-CM: G54.6 ICD-9-CM: 353.6 Neuralgia     ICD-10-CM: M79.2 ICD-9-CM: 729.2 Chronic pain syndrome     ICD-10-CM: G89.4 ICD-9-CM: 338.4 Pain of left lower extremity     ICD-10-CM: M79.605 ICD-9-CM: 729.5 PAD (peripheral artery disease) (HCC)     ICD-10-CM: I73.9 ICD-9-CM: 443.9 Encounter for long-term (current) drug use     ICD-10-CM: Z79.899 ICD-9-CM: V58.69 Vitals BP Pulse Temp Resp Height(growth percentile) Weight(growth percentile) 102/54 (BP 1 Location: Right arm, BP Patient Position: Sitting) 74 98.6 °F (37 °C) (Oral) 18 5' 9\" (1.753 m) 162 lb (73.5 kg) BMI OB Status Smoking Status 23.92 kg/m2 Hysterectomy Former Smoker BMI and BSA Data Body Mass Index Body Surface Area  
 23.92 kg/m 2 1.89 m 2 Preferred Pharmacy Pharmacy Name Phone [facility], [address] [phone] Your Updated Medication List  
  
   
This list is accurate as of 4/9/18  3:46 PM.  Always use your most recent med list.  
  
  
  
  
 albuterol 90 mcg/actuation inhaler Commonly known as:  PROVENTIL HFA, VENTOLIN HFA, PROAIR HFA Take 2 Puffs by inhalation as needed for Wheezing. Indications: asthma  
  
 atorvastatin 80 mg tablet Commonly known as:  LIPITOR Take 80 mg by mouth nightly. Indications: HYPERCHOLESTEROLEMIA  
  
 carvedilol 12.5 mg tablet Commonly known as:  Jerl Specter Take 12.5 mg by mouth two (2) times daily (with meals). Indications: HYPERTENSION, CHF  
  
 cephALEXin 500 mg capsule Commonly known as:  Samara Bees Take 1 Cap by mouth three (3) times daily. CORRECTOL 5 mg Tab Generic drug:  bisacodyl Take  by mouth.  
  
 enoxaparin 120 mg/0.8 mL injection Commonly known as:  LOVENOX  
120 mg by SubCUTAneous route daily. furosemide 40 mg tablet Commonly known as:  LASIX Take 40 mg by mouth daily. gabapentin 600 mg tablet Commonly known as:  NEURONTIN Take 600 mg by mouth three (3) times daily. Indications: NEUROPATHIC PAIN Iron 325 mg (65 mg iron) tablet Generic drug:  ferrous sulfate Take  by mouth three (3) times daily (with meals). isosorbide mononitrate ER 60 mg CR tablet Commonly known as:  IMDUR Take 60 mg by mouth every morning. losartan 25 mg tablet Commonly known as:  COZAAR Take 25 mg by mouth daily. Indications: HYPERTENSION  
  
 * methadone 10 mg tablet Commonly known as:  DOLOPHINE Take 2 Tabs by mouth four (4) times daily for 30 days. Max Daily Amount: 80 mg. for chronic, severe, refractory pain. Indications: Chronic Pain, Severe Pain * methadone 10 mg tablet Commonly known as:  DOLOPHINE Take 2 Tabs by mouth four (4) times daily for 30 days. Max Daily Amount: 80 mg. for chronic, severe, refractory pain. Indications: Chronic Pain, Severe Pain Start taking on:  4/28/2018 * methadone 10 mg tablet Commonly known as:  DOLOPHINE Take 2 Tabs by mouth four (4) times daily for 30 days. Max Daily Amount: 80 mg. for chronic, severe, refractory pain. Indications: Chronic Pain, Severe Pain Start taking on:  5/27/2018 * methadone 10 mg tablet Commonly known as:  DOLOPHINE Take 2 Tabs by mouth four (4) times daily for 30 days. Max Daily Amount: 80 mg. for chronic, severe, refractory pain. Indications: Chronic Pain, Severe Pain Start taking on:  6/23/2018  
  
 naloxone 4 mg/actuation nasal spray Commonly known as:  NARCAN  
4 mg by Nasal route once as needed (opioid overdose) for up to 1 dose. May substitute 2 mg syringe if insurance requires  
  
 omeprazole 20 mg capsule Commonly known as:  PRILOSEC Take 20 mg by mouth daily. Indications: GASTROESOPHAGEAL REFLUX  
  
 * oxyCODONE IR 15 mg immediate release tablet Commonly known as:  Wyvonnia Stein Take 1 Tab by mouth four (4) times daily as needed for Pain for up to 30 days. Max Daily Amount: 4 Tabs. May take 1/2 to 1 tab QID prn breakthrough pain  Indications: NEUROPATHIC PAIN, Pain * oxyCODONE IR 15 mg immediate release tablet Commonly known as:  Wyvonnia Stein Take 1 Tab by mouth four (4) times daily as needed for Pain for up to 30 days. Max Daily Amount: 4 Tabs. May take 1/2 to 1 tab QID prn breakthrough pain  Indications: NEUROPATHIC PAIN, Pain Start taking on:  4/25/2018 * oxyCODONE IR 15 mg immediate release tablet Commonly known as:  Wyvonnia Stein Take 1 Tab by mouth four (4) times daily as needed for Pain for up to 30 days. Max Daily Amount: 4 Tabs. May take 1/2 to 1 tab QID prn breakthrough pain  Indications: NEUROPATHIC PAIN, Pain Start taking on:  5/24/2018 * oxyCODONE IR 15 mg immediate release tablet Commonly known as:  Valene Vaughn Take 1 Tab by mouth four (4) times daily as needed for Pain for up to 30 days. Max Daily Amount: 4 Tabs. May take 1/2 to 1 tab QID prn breakthrough pain  Indications: NEUROPATHIC PAIN, Pain Start taking on:  6/23/2018  
  
 polyvinyl alcohol 1.4 % ophthalmic solution Commonly known as:  Niru Evans Administer 1 Drop to both eyes as needed. potassium chloride 20 mEq packet Commonly known as:  KLOR-CON Take 20 mEq by mouth two (2) times a day. Indications: HYPOKALEMIA PREVENTION  
  
 senna-docusate 8.6-50 mg per tablet Commonly known as:  Jorene Calk Take 1 Tab by mouth as needed for Constipation. Alternates with Docusate sodium. venlafaxine- mg capsule Commonly known as:  EFFEXOR-XR  
TAKE 1 CAPSULE BY MOUTH DAILY FOR 30 DAYS  
  
 XARELTO 10 mg tablet Generic drug:  rivaroxaban Take  by mouth daily. * Notice: This list has 8 medication(s) that are the same as other medications prescribed for you. Read the directions carefully, and ask your doctor or other care provider to review them with you. Prescriptions Printed Refills  
 oxyCODONE IR (ROXICODONE) 15 mg immediate release tablet 0 Starting on: 6/23/2018 Sig: Take 1 Tab by mouth four (4) times daily as needed for Pain for up to 30 days. Max Daily Amount: 4 Tabs. May take 1/2 to 1 tab QID prn breakthrough pain  Indications: NEUROPATHIC PAIN, Pain Class: Print Route: Oral  
 oxyCODONE IR (ROXICODONE) 15 mg immediate release tablet 0 Starting on: 5/24/2018 Sig: Take 1 Tab by mouth four (4) times daily as needed for Pain for up to 30 days. Max Daily Amount: 4 Tabs. May take 1/2 to 1 tab QID prn breakthrough pain  Indications: NEUROPATHIC PAIN, Pain Class: Print Route: Oral  
 oxyCODONE IR (ROXICODONE) 15 mg immediate release tablet 0 Starting on: 4/25/2018 Sig: Take 1 Tab by mouth four (4) times daily as needed for Pain for up to 30 days. Max Daily Amount: 4 Tabs. May take 1/2 to 1 tab QID prn breakthrough pain  Indications: NEUROPATHIC PAIN, Pain Class: Print Route: Oral  
 methadone (DOLOPHINE) 10 mg tablet 0 Starting on: 6/23/2018 Sig: Take 2 Tabs by mouth four (4) times daily for 30 days. Max Daily Amount: 80 mg. for chronic, severe, refractory pain. Indications: Chronic Pain, Severe Pain Class: Print Route: Oral  
 methadone (DOLOPHINE) 10 mg tablet 0 Starting on: 5/27/2018 Sig: Take 2 Tabs by mouth four (4) times daily for 30 days. Max Daily Amount: 80 mg. for chronic, severe, refractory pain. Indications: Chronic Pain, Severe Pain Class: Print Route: Oral  
 methadone (DOLOPHINE) 10 mg tablet 0 Starting on: 4/28/2018 Sig: Take 2 Tabs by mouth four (4) times daily for 30 days. Max Daily Amount: 80 mg. for chronic, severe, refractory pain. Indications: Chronic Pain, Severe Pain Class: Print Route: Oral  
  
We Performed the Following AMB POC DRUG SCREEN () [ HCP] DRUG SCREEN [[id number] Custom] Introducing [facility] [place] [facility] SERVICES! Dear Vickie Ramos: Thank you for requesting a [employer] account. Our records indicate that you already have an active [employer] account. You can access your account anytime at [email or web address]. [email or web address] Did you know that you can access your hospital and ER discharge instructions at any time in [employer]? You can also review all of your test results from your hospital stay or ER visit. Additional Information If you have questions, please visit the Frequently Asked Questions section of the [employer] website at [email or web address] Remember, [employer] is NOT to be used for urgent needs. For medical emergencies, dial 911. Now available from your iPhone and Android! Please provide this summary of care documentation to your next provider. Your primary care clinician is listed as Elise Campos. If you have any questions after today's visit, please call [phone].

## 2018-05-15 NOTE — ANESTHESIA POSTPROCEDURE EVALUATION
Post-Anesthesia Evaluation & Assessment    Visit Vitals    /55    Pulse (!) 56    Temp 36.4 °C (97.5 °F)    Resp 14    Ht 5' 9\" (1.753 m)    Wt 75.5 kg (166 lb 7 oz)    SpO2 100%    BMI 24.58 kg/m2       Nausea/Vomiting: no nausea    Post-operative hydration adequate. Pain score (VAS): 2    Mental status & Level of consciousness: alert and oriented x 3    Neurological status: moves all extremities, sensation grossly intact    Pulmonary status: airway patent, no supplemental oxygen required    Complications related to anesthesia: none    Patient has met all discharge requirements.     Additional comments:        Daniela Bowen MD

## 2018-05-15 NOTE — OP NOTES
PREOPERATIVE DIAGNOSIS: Bilateral hydronephrosis.     POSTOPERATIVE DIAGNOSIS: Bilateral hydronephrosis.     PROCEDURES PERFORMED  1. Cystoscopy. 2. Bilateral retrograde pyelograms with interpretation. 3. Bilateral stent change.     SURGEON: Linzie Olszewski, MD    ASSISTANT: None     ANESTHESIA: General.     ESTIMATED BLOOD LOSS: None.     SPECIMENS REMOVED: None.     FINDINGS: There was a normal bladder with bilateral stricture of the  ureters at the pelvic inlet tht was very tight. Severe right hydro with blunted calyces.     COMPLICATIONS: None.     IMPLANTS: Right 6x26 stent,  Left 6 x 24 stent.     CLINICAL NOTE: The patient is a 18-year-old female with a history of  vasculopathy. She has bilateral iliac grafts. She has significant scarring  around the ureters and has stents in place for bilateral hydro.     OPERATIVE PROCEDURE: Preoperatively, risks and benefits of  surgery were described to the patient. The risks include, but are not  limited to bleeding, infection, injury to bladder, injury to ureter, injury to  kidney, possible need for future procedures, possible continued  bleeding, possible failure of the stents or obstruction. The patient  assumed the risks and signed the informed consent.     The patient was taken to the operating room, placed on the OR table in  supine position. She was administered general anesthetic. She was  administered intravenous antibiotics. She was placed in dorsal  lithotomy position, prepped and draped in the usual sterile manner. A  21-Spanish cystoscope and sheath were then inserted transurethrally  atraumatically under direct vision using a 30-degree lens. Panendoscopy was done. The urethra was normal. Bilateral ureteral  orifices were in normal anatomic position. There was 1 stent  emanating from the right ureteral orifice and 1 stent emanating from  the left. There were no stones, no tumors, no areas of concern within  the bladder.  There was no area of active bleeding within the bladder. There was no blood coming out of either ureter. The UO was observed for  several minutes. Next, using alligator forceps, all the stents were  grasped and removed.     Next, I inserted a 5-Ind[place]ian open-ended ureteral catheter into the  bladder and cannulated the right ureteral orifice. Retrograde pyelogram  was done in the usual manner using 20 mL of Visipaque dye. There  was noted to be a very tight stricture right at the pelvic inlet. This was  quite long in nature. Proximal to this had moderate to severe  hydroureteronephrosis with blunted calyces.     Next, I passed a Sensor wire through the open-ended catheter up to  the kidney. The open-ended catheter was removed. I then passed a 6-  [place] 26-cm stent over the wire up to the kidney. The wire was  removed. Fluoroscopy confirmed the proximal coil was in the kidney,  the distal coil was noted to be in the bladder by direct vision. At this  point, I cannulated the left ureteral orifice with a 5-Indonesian open-ended  ureteral catheter. Retrograde pyelogram was done using 20 mL of  Visipaque dye. There was noted to be again a filling defect where the  ureter was narrowed at the pelvic inlet. Proximal to this, there was  moderate hydroureteronephrosis with minimal tortuosity. There was no  blunting of calyces on the left side. Next, I passed a Sensor wire  through the open-ended catheter up to the kidney and then the open-ended  catheter was removed. I then passed 6-Indonesian 24 cm stents over the  wire up to the kidney. The wire was removed. Fluoroscopy confirmed  the proximal coil was in the kidney, the distal coil was noted to be in  the bladder by direct vision.  The patient was taken out of lithotomy  position, revived from anesthesia and taken to recovery in stable  condition.  Gali Verma MD

## 2018-05-15 NOTE — DISCHARGE INSTRUCTIONS
Drink plenty of fluids to keep the urine clear  Regular diet  Take prescriptions as directed  Shower tomorrow  Dr. Mabry Agata office will call with follow up appointment  Contact Dr. Mabry Agata office with any Post op questions or concerns at [phone] from Nurse    PATIENT INSTRUCTIONS:    After general anesthesia or intravenous sedation, for 24 hours or while taking prescription Narcotics:  · Limit your activities  · Do not drive and operate hazardous machinery  · Do not make important personal or business decisions  · Do  not drink alcoholic beverages  · If you have not urinated within 8 hours after discharge, please contact your surgeon on call. Report the following to your surgeon:  · Excessive pain, swelling, redness or odor of or around the surgical area  · Temperature over 100.5  · Nausea and vomiting lasting longer than 4 hours or if unable to take medications  · Any signs of decreased circulation or nerve impairment to extremity: change in color, persistent  numbness, tingling, coldness or increase pain  · Any questions    What to do at Home:  Recommended activity: Activity as tolerated and No driving while on analgesics,     If you experience any of the following symptoms fever, chills, uncontrollable pain , active bleeding ( thick urine with clots ) , difficulty urinating, please follow up with Dr. Sukhwinder Shrestha. *  Please give a list of your current medications to your Primary Care Provider. *  Please update this list whenever your medications are discontinued, doses are      changed, or new medications (including over-the-counter products) are added. *  Please carry medication information at all times in case of emergency situations. These are general instructions for a healthy lifestyle:    No smoking/ No tobacco products/ Avoid exposure to second hand smoke  Surgeon General's Warning:  Quitting smoking now greatly reduces serious risk to your health.     Obesity, smoking, and sedentary lifestyle greatly increases your risk for illness    A healthy diet, regular physical exercise & weight monitoring are important for maintaining a healthy lifestyle    You may be retaining fluid if you have a history of heart failure or if you experience any of the following symptoms:  Weight gain of 3 pounds or more overnight or 5 pounds in a week, increased swelling in our hands or feet or shortness of breath while lying flat in bed. Please call your doctor as soon as you notice any of these symptoms; do not wait until your next office visit. Recognize signs and symptoms of STROKE:    F-face looks uneven    A-arms unable to move or move unevenly    S-speech slurred or non-existent    T-time-call 911 as soon as signs and symptoms begin-DO NOT go       Back to bed or wait to see if you get better-TIME IS BRAIN. Warning Signs of HEART ATTACK     Call 911 if you have these symptoms:   Chest discomfort. Most heart attacks involve discomfort in the center of the chest that lasts more than a few minutes, or that goes away and comes back. It can feel like uncomfortable pressure, squeezing, fullness, or pain.  Discomfort in other areas of the upper body. Symptoms can include pain or discomfort in one or both arms, the back, neck, jaw, or stomach.  Shortness of breath with or without chest discomfort.  Other signs may include breaking out in a cold sweat, nausea, or lightheadedness. Don't wait more than five minutes to call 911 - MINUTES MATTER! Fast action can save your life. Calling 911 is almost always the fastest way to get lifesaving treatment. Emergency Medical Services staff can begin treatment when they arrive -- up to an hour sooner than if someone gets to the hospital by car. Patient armband removed and shredded    The discharge information has been reviewed with the patient and caregiver. The patient and caregiver verbalized understanding.   Discharge medications reviewed with the patient and caregiver and appropriate educational materials and side effects teaching were provided.   ___________________________________________________________________________________________________________________________________

## 2018-05-15 NOTE — IP AVS SNAPSHOT
[address] [address] 
[phone] Patient: Shelton Alcantara MRN: [id number] TXF:[id number] About your hospitalization You were admitted on:  May 15, 2018 You last received care in the:  [facility] PACU You were discharged on:  May 15, 2018 Why you were hospitalized Your primary diagnosis was:  Not on File Your diagnoses also included:  Hydronephrosis, Bilateral  
  
Follow-up Information Follow up With Details Comments Contact Info Len Clifford MD   [place] [id number] 
[phone] Kirsty Smart MD  office will call with follow up appointment [address] [address] 
[phone] Discharge Orders None A check miguel indicates which time of day the medication should be taken. My Medications START taking these medications Instructions Each Dose to Equal  
 Morning Noon Evening Bedtime  
 cephALEXin 500 mg capsule Commonly known as:  Earmon Gravely Your last dose was: Your next dose is: Take 1 Cap by mouth three (3) times daily. 500 mg  
    
   
   
   
  
 traMADol 50 mg tablet Commonly known as:  ULTRAM  
   
Your last dose was: Your next dose is: Take 1 Tab by mouth every six (6) hours as needed for Pain. Max Daily Amount: 200 mg.  
 50 mg CHANGE how you take these medications Instructions Each Dose to Equal  
 Morning Noon Evening Bedtime  
 methadone 10 mg tablet Commonly known as:  DOLOPHINE Start taking on:  6/23/2018 What changed:  Another medication with the same name was removed. Continue taking this medication, and follow the directions you see here. Your last dose was: Your next dose is: Take 2 Tabs by mouth four (4) times daily for 30 days.  Max Daily Amount: 80 mg. for chronic, severe, refractory pain. Indications: Chronic Pain, Severe Pain 20 mg  
    
   
   
   
  
 oxyCODONE IR 15 mg immediate release tablet Commonly known as:  Vi Lyonses Start taking on:  6/23/2018 What changed:  additional instructions Your last dose was: Your next dose is: Take 1 Tab by mouth four (4) times daily as needed for Pain for up to 30 days. Max Daily Amount: 4 Tabs. May take 1/2 to 1 tab QID prn breakthrough pain  Indications: NEUROPATHIC PAIN, Pain 15 mg CONTINUE taking these medications Instructions Each Dose to Equal  
 Morning Noon Evening Bedtime  
 albuterol 90 mcg/actuation inhaler Commonly known as:  PROVENTIL HFA, VENTOLIN HFA, PROAIR HFA Your last dose was: Your next dose is: Take 2 Puffs by inhalation as needed for Wheezing. Indications: asthma 2 Puff  
    
   
   
   
  
 atorvastatin 80 mg tablet Commonly known as:  LIPITOR Your last dose was: Your next dose is: Take 80 mg by mouth nightly. Indications: HYPERCHOLESTEROLEMIA 80 mg  
    
   
   
   
  
 carvedilol 12.5 mg tablet Commonly known as:  Yrn Crocker Your last dose was: Your next dose is: Take 6.25 mg by mouth two (2) times daily (with meals). Indications: hypertension, CHF  
 6.25 mg  
    
   
   
   
  
 EFFEXOR  mg capsule Generic drug:  venlafaxine-SR Your last dose was: Your next dose is: Take 150 mg by mouth daily. 150 mg  
    
   
   
   
  
 enoxaparin 120 mg/0.8 mL injection Commonly known as:  LOVENOX Your last dose was: Your next dose is:    
   
   
 120 mg by SubCUTAneous route daily. 120 mg  
    
   
   
   
  
 furosemide 40 mg tablet Commonly known as:  LASIX Your last dose was: Your next dose is: Take 40 mg by mouth every other day. As needed  40 mg  
 gabapentin 600 mg tablet Commonly known as:  NEURONTIN Your last dose was: Your next dose is: Take 600 mg by mouth three (3) times daily. Indications: NEUROPATHIC PAIN  
 600 mg Iron 325 mg (65 mg iron) tablet Generic drug:  ferrous sulfate Your last dose was: Your next dose is: Take 325 mg by mouth three (3) times daily (with meals). 325 mg  
    
   
   
   
  
 isosorbide mononitrate ER 60 mg CR tablet Commonly known as:  IMDUR Your last dose was: Your next dose is: Take 60 mg by mouth every morning. 60 mg  
    
   
   
   
  
 losartan 25 mg tablet Commonly known as:  COZAAR Your last dose was: Your next dose is: Take 25 mg by mouth daily. Indications: HYPERTENSION  
 25 mg  
    
   
   
   
  
 naloxone 4 mg/actuation nasal spray Commonly known as:  [employer] Your last dose was: Your next dose is:    
   
   
 4 mg by Nasal route once as needed (opioid overdose) for up to 1 dose. May substitute 2 mg syringe if insurance requires 4 mg  
    
   
   
   
  
 omeprazole 20 mg capsule Commonly known as:  PRILOSEC Your last dose was: Your next dose is: Take 20 mg by mouth daily. Indications: GASTROESOPHAGEAL REFLUX  
 20 mg  
    
   
   
   
  
 polyvinyl alcohol 1.4 % ophthalmic solution Commonly known as:  Arturo José Miguel Your last dose was: Your next dose is:    
   
   
 Administer 1 Drop to both eyes as needed. 1 Drop  
    
   
   
   
  
 potassium chloride 20 mEq packet Commonly known as:  KLOR-CON Your last dose was: Your next dose is: Take 20 mEq by mouth every other day. Takes with Lasix  Indications: hypokalemia prevention 20 mEq Where to Get Your Medications Information on where to get these meds will be given to you by the nurse or doctor. ! Ask your nurse or doctor about these medications  
  cephALEXin 500 mg capsule  
 traMADol 50 mg tablet Opioid Education Prescription Opioids: What You Need to Know: 
 
 
 
F-face looks uneven A-arms unable to move or move unevenly S-speech slurred or non-existent T-time-call 911 as soon as signs and symptoms begin-DO NOT go Back to bed or wait to see if you get better-TIME IS BRAIN. Warning Signs of HEART ATTACK Call 911 if you have these symptoms: 
? Chest discomfort. Most heart attacks involve discomfort in the center of the chest that lasts more than a few minutes, or that goes away and comes back. It can feel like uncomfortable pressure, squeezing, fullness, or pain. ? Discomfort in other areas of the upper body. Symptoms can include pain or discomfort in one or both arms, the back, neck, jaw, or stomach. ? Shortness of breath with or without chest discomfort. ? Other signs may include breaking out in a cold sweat, nausea, or lightheadedness. Don't wait more than five minutes to call [address]! Fast action can save your life. Calling 911 is almost always the fastest way to get lifesaving treatment. Emergency Medical Services staff can begin treatment when they arrive  up to an hour sooner than if someone gets to the hospital by car. Patient armband removed and shredded The discharge information has been reviewed with the patient and caregiver. The patient and caregiver verbalized understanding. Discharge medications reviewed with the patient and caregiver and appropriate educational materials and side effects teaching were provided. ___________________________________________________________________________________________________________________________________ Introducing [facility]! Dear Johnson: Thank you for requesting a [employer] account. Our records indicate that you already have an active [employer] account. You can access your account anytime at [email or web address]. [employer] Did you know that you can access your hospital and ER discharge instructions at any time in [employer]? You can also review all of your test results from your hospital stay or ER visit. Additional Information If you have questions, please visit the Frequently Asked Questions section of the [employer] website at [email or web address]/. Remember, [employer] is NOT to be used for urgent needs. For medical emergencies, dial 911. Now available from your iPhone and Android! Introducing Cesar Martin As a [facility] patient, I wanted to make you aware of our electronic visit tool called Cesar Martin. [facility] 24/7 allows you to connect within minutes with a medical provider 24 hours a day, seven days a week via a mobile device or tablet or logging into a secure website from your computer. You can access Cesar Martin from anywhere in the [place]. A virtual visit might be right for you when you have a simple condition and feel like you just dont want to get out of bed, or cant get away from work for an appointment, when your regular [facility] provider is not available (evenings, weekends or holidays), or when youre out of town and need minor care. Electronic visits cost only $49 and if the [employer] [facility] 24/7 provider determines a prescription is needed to treat your condition, one can be electronically transmitted to a nearby pharmacy*. Please take a moment to enroll today if you have not already done so. The enrollment process is free and takes just a few minutes. To enroll, please download the [employer] im[employer]iaz to your tablet or phone, or visit [email or web address] to enroll on your computer. And, as an [address] [place] [address] patient with a [employer] account, the results of your visits will be scanned into your electronic medical record and your primary care provider will be able to view the scanned results. We urge you to continue to see your regular Kemi Vaughns provider for your ongoing medical care. And while your primary care provider may not be the one available when you seek a [employer] virtual visit, the peace of mind you get from getting a real diagnosis real time can be priceless. For more information on Cesar [employer], view our Frequently Asked Questions (FAQs) at [email or web address]. Sincerely, 
 
Tiffanie Coello MD 
Chief Medical Officer [address] *:  certain medications cannot be prescribed via [employer] Unresulted Labs-Please follow up with your PCP about these lab tests Order Current Status XR RETROGRADE PYELOGRAM BILAT In process Providers Seen During Your Hospitalization Provider Specialty Primary office phone Thom Siddiqi MD Urology [phone] Your Primary Care Physician (PCP) Primary Care Physician Office Phone Office Fax Quan Hare, [facility] [phone] You are allergic to the following Allergen Reactions Heparin Analogues Other (comments) Opposite reaction to heparin Iodinated Contrast- Oral And Iv Dye Hives Recent Documentation Height Weight BMI OB Status Smoking Status 1.753 m 75.5 kg 24.58 kg/m2 Hysterectomy Former Smoker Emergency Contacts Name Discharge Info Relation Home Work Mobile [phone] CAREGIVER [3] Spouse [3] [phone] Patient Belongings The following personal items are in your possession at time of discharge: 
  Dental Appliances: Lowers  Visual Aid: Contacts, Glasses      Home Medications: Kept at bedside (albuterol)   Jewelry: None  Clothing: Pants, Shirt, Undergarments, Footwear (Jonatan Lackey)    Other Valuables: Eyeglasses, Wheelchair, Contact Lenses Zenobia Rough) Please provide this summary of care documentation to your next provider. Signatures-by signing, you are acknowledging that this After Visit Summary has been reviewed with you and you have received a copy. Patient Signature:  ____________________________________________________________ Date:  ____________________________________________________________  
  
Heather Rivera Provider Signature:  ____________________________________________________________ Date:  ____________________________________________________________

## 2018-05-15 NOTE — PERIOP NOTES
Report to MARTHA Schulz RN transferred to phase 2 level of care. Denies pain or discomfort napping on and off vital signs stable. Patient did not bring prosthesis say she uses her wheelchair mostly for mobility. I offered bedpan patient refused would rather use commode.

## 2018-05-15 NOTE — H&P
A    Urology [address]  [address] [place] [address], [address], [address] [facility]  phone: [phone]  fax: [phone]          Patient: Zoë Adler  YOB: 1950        Assessment/Plan  # Detail Type Description    1. Assessment Other hydronephrosis (N13.39). Patient Plan She has a hx of chronic hydro secondary to her prior vascular stent grafts. She has been doing really well lately. It is time to schedule her for her stent change. Risk of bleeding, infection and possible inability to place a stent were discussed. She will proceed. 2. Assessment Gross hematuria (R31.0). Patient Plan Her hematuria has resolved since her lovenox. Rushie Loud seemed to be the culprit for her hematuria in the past.    Plan Orders Further diagnostic evaluations ordered today include(s) ELECTROCARDIOGRAM, COMPLETE to be performed. This 78year old female presents for Hydronephrosis and Hematuria. History of Present Illness:  1. Hydronephrosis      Onset was 4 years ago. Severity level is moderate. It occurs constantly. The problem is worse. Location of pain is RLQ. The pain does not radiate. There are no aggravating factors. The patient lists no relieving factors. Associated symptoms include diarrhea, hematuria and pain. Pertinent negatives include chills, constipation, fever, nausea and vomiting. Additional information: Baseline CT (2014)-- severe right hydronephrosis and mild left hydronephrosis seconday to presumed  aortobifem  graft. Bialteral stents were placed on 8/22/14 and she is dis well with resolution of pain. Her pain recurred and she had her stents changed 3/10/15. She hadn't had any pain related to the stents. She is doing well and is happy. She denied any urinary complaints or hematuria (2/24/16)  A stent was changed 3/2016. ..      She then had gross hematuria that was severe.   [place] [facility] [place] her stents 7/6/16 and her bleeding subsided. However, she then went back to the hospital for problems with respiratory distress. .    On July 11, she started to have right lower quadrant pain and the whole across the lower abdeomen and CVA area. .   CT = worse right hydronephrosis, stent in good posiition. 7/21/16 - MAG-3 renal scan with lasix with bialt stents in place and almanza. Left side is normal.  Right side with significan obstruction with T1/2 greater than 20 minutes. 86% left function, 14% right function. 7/29/16 - she feels okay without pain or problem    She had new stents placed 8/2016 with a right double stent. MAG3 renal scan with bialteral stents and almanza (9/2016), and still with significant obstuction, and 15% right function and 85% left function. 8/2017 -- She feels well.  she had her stents changed 7/2017. She feels well without flank pain or urinary difficulties. she does have problems with intermittet hematuria that is worse on Xarelto. She cntinues to beleed. Creat = 1.0 (8/2017)    5/4/18 -- She is doing well and has switched to Lovenox and feels fine. no flank pain or radiation. She has had some dark urine, but no gross hematuria since last stent change. No UTI. No dysuira. Creat = 1.5 (4/20/18),    HCt = 27.8 (4/20/18) -- negative   Urine Cx = negative (4/2018)  2. Hematuria      The patient presents with hematuria (gross). The problem began 1 year ago. The symptoms are intermittent. The problem has resolved. She presents with pain. Pertinent history includes being at least 37years of age but not family history of stones. The patient has not had any prior screenings. Aggravating factors include blood thinners (xaralto). Denies relieving factors. She denies chills, fever, nausea and vomiting. Additional information: She has been on Xarelto and continues to bleed as long as she is on it. It stops when she stops it. She has required transfusions every 2-3 weeks.   Changing her stnets offered no relief    5/4/18 - she stopped Xarelto and is doing much better. No more hematuria since changing to Lovenox inj daily. PAST MEDICAL/SURGICAL HISTORY   (Reviewed, updated)    Disease/disorder Onset Date Management Date Comments     cysto, b/l stent change 07/06/2016      Bilateral stent placement 08/22/2014      Cholecystectomy       Hysterectomy       Sling operation for urinary incontinence       ABK amputation       OBSTETRIC HISTORY:  Not currently pregnant. PROBLEM LIST:  Problem Description Onset Date   Depression 08/15/2014   PAD 08/15/2014   DVT 08/15/2014   Bleeding disorder 08/15/2014   Anxiety 08/15/2014   Hypertension 08/15/2014   Back pain 08/15/2014   Dyslipidemia 08/15/2014   Hyperlipidemia 08/15/2014   Neuropathy 08/15/2014   GERD 08/15/2014   Asthma 08/15/2014   Impaired glucose tolerance 08/15/2014   Interstitial lung disease 08/15/2014     Medication Reconciliation  Medications reconciled today.   Medication Reviewed  Adherence Medication Name Sig Desc Elsewhere Status   taking as directed gabapentin 800 mg tablet take 1 tablet by oral route 3 times every day Y Verified   taking as directed omeprazole 20 mg capsule,delayed release take 1 capsule by oral route  every day before a meal Y Verified   taking as directed Effexor  mg capsule,extended release take 1 capsule by oral route  every day Y Verified   taking as directed Proventil HFA 90 mcg/actuation aerosol inhaler inhale 2 puff by inhalation route  every 4 - 6 hours as needed Y Verified   taking as directed METHADONE HCL take 1 tablet by ORAL route 2 times every day as needed Y Verified   taking as directed losartan 25 mg tablet take 1 tablet by oral route  every day Y Verified   taking as directed Spiriva with HandiHaler 18 mcg and inhalation capsules inhale 1 capsule by inhalation route  every day Y Verified   taking as directed atorvastatin 80 mg tablet take 1 tablet by oral route  every day Y Verified   taking as directed Lasix 40 mg tablet take 1 tablet by ORAL route  every evening Y Verified   taking as directed albuterol sulfate 0.63 mg/3 mL solution for nebulization  Y Verified   taking as directed Coreg 12.5 mg tablet take 1 tablet by oral route 2 times every day with food Y Verified   taking as directed polyethylene glycol 3350 17 gram/dose oral powder take (17G)  by oral route  every day mixed with 8 oz. water, juice, soda, coffee or tea Y Verified   taking as directed losartan 25 mg tablet take 1 tablet by oral route  every day Y Verified   taking as directed Roxicodone 15 mg tablet take 1 tablet by oral route  every 6 hours Y Verified   taking as directed enoxaparin 120 mg/0.8 mL subcutaneous syringe inject (1MG/KG)  by subcutaneous route  every 12 hours Y Verified     Allergies  Ingredient Reaction Medication Name Comment   HEPARIN      IVP Dye        (Reviewed, no changes.)  Family History:  (Reviewed, updated)  Relationship Family Member Name  Age at Death Condition Onset Age Cause of Death       Family history of Diabetes mellitus  N       Family history of Cancer, breast  N       Family history of Cancer, lung  N   Brother    Hypertension  N   Father    Congestive heart failure  N   Father    Hypertension  N   Father    Stroke  N   Mother    Hypertension  N         Social History:  (Reviewed, updated)  Tobacco use reviewed. Preferred language is [place]. The patient does not need an . MARITAL STATUS/FAMILY/SOCIAL SUPPORT  Currently . Tobacco use status: Occasional cigarette smoker. Smoking status: Light tobacco smoker. SMOKING STATUS  Use Status Type Smoking Status Usage Per Day Years Used Total Pack Years   yes Cigarette Light tobacco smoker 5 Packs         ALCOHOL  There is no history of alcohol use. CAFFEINE  The patient uses caffeine: soda and tea. - 10 cups a day. Review of Systems  System Neg/Pos Details   Constitutional Positive Pain. Constitutional Negative Chills and fever. ENMT Negative Ear infections and sore throat. Eyes Negative Blurred vision, double vision and eye pain. Respiratory Negative Asthma, chronic cough, dyspnea and wheezing. Cardio Negative Chest pain. GI Negative Constipation, decreased appetite, diarrhea, nausea and vomiting.  Positive Hematuria. Endocrine Negative Cold intolerance, heat intolerance, increased thirst and weight loss. Neuro Negative Headache and tremors. Psych Negative Anxiety and depression. Integumentary Negative Itching skin and rash. MS Negative Back pain and joint pain. Hema/Lymph Negative Easy bleeding. Physical Exam  Exam Findings Details   Constitutional * Overall appearance - absent left leg. Neck Exam Normal Inspection - Normal.   Respiratory Normal Inspection - Normal.   Abdomen Normal No abdominal tenderness. Genitourinary Normal No Suprapubic tenderness. No CVA tenderness. Extremity Normal No Edema. Psychiatric Normal Oriented to time, place, person and situation. Appropriate mood and affect.          Medications (added, continued, or stopped today)  Started Medication Directions PRN Status PRN Reason Instruction Stopped    Advair Diskus 250 mcg-50 mcg/dose powder for inhalation inhale 1 puff by inhalation route 2 times every day in the morning and evening approximately 12 hours apart N   05/04/2018    albuterol sulfate 0.63 mg/3 mL solution for nebulization  N       atorvastatin 80 mg tablet take 1 tablet by oral route  every day N      07/19/2017 Bactrim  mg-160 mg tablet take 1 tablet by oral route  every 12 hours N   05/04/2018    Coreg 12.5 mg tablet take 1 tablet by oral route 2 times every day with food N      07/20/2016 Dilaudid 2 mg tablet take 2 tablet by oral route  every 4 - 6 hours as needed N   05/04/2018    Effexor  mg capsule,extended release take 1 capsule by oral route  every day N       enoxaparin 120 mg/0.8 mL subcutaneous syringe inject (1MG/KG)  by subcutaneous route  every 12 hours N      02/24/2016 Estrace 0.01% (0.1 mg/gram) vaginal cream apply to the urethra every night.  N   05/04/2018    gabapentin 800 mg tablet take 1 tablet by oral route 3 times every day N       Lasix 40 mg tablet take 1 tablet by ORAL route  every evening N       losartan 25 mg tablet take 1 tablet by oral route  every day N       losartan 25 mg tablet take 1 tablet by oral route  every day N       METHADONE HCL take 1 tablet by ORAL route 2 times every day as needed N       Mucinex 600 mg tablet, extended release take 1 tablet by oral route  every 12 hours as needed N   05/04/2018    Nicoderm CQ 21 mg/24 hr daily transdermal patch apply 1 patch by transdermal route  every day and remove at bedtime N   05/04/2018    omeprazole 20 mg capsule,delayed release take 1 capsule by oral route  every day before a meal N       polyethylene glycol 3350 17 gram/dose oral powder take (17G)  by oral route  every day mixed with 8 oz. water, juice, soda, coffee or tea N       prednisone 10 mg tablet take 1 tablet by oral route  every day N   05/04/2018    Proventil HFA 90 mcg/actuation aerosol inhaler inhale 2 puff by inhalation route  every 4 - 6 hours as needed N       Roxicodone 15 mg tablet take 1 tablet by oral route  every 6 hours N       Singulair 10 mg tablet take 1 tablet by oral route  every day in the evening N   05/04/2018    Spiriva with HandiHaler 18 mcg and inhalation capsules inhale 1 capsule by inhalation route  every day N       spironolactone 25 mg tablet take 1 tablet by oral route  every day N   05/04/2018 08/09/2016 sulfamethoxazole 800 mg-trimethoprim 160 mg tablet take 1 tablet by oral route  every 12 hours N   05/04/2018    Xarelto 20 mg tablet take 1 tablet by oral route  every day with the evening meal N   05/04/2018   Completed by:            Munir Reynolds MD

## 2018-05-15 NOTE — ANESTHESIA PREPROCEDURE EVALUATION
Anesthetic History   No history of anesthetic complications            Review of Systems / Medical History  Patient summary reviewed, nursing notes reviewed and pertinent labs reviewed    Pulmonary    COPD        Asthma     Comments: Interstitial lung disease   Hx of smoking - stopped one year ago   Neuro/Psych         Psychiatric history     Cardiovascular    Hypertension              Exercise tolerance: <4 METS     GI/Hepatic/Renal     GERD           Endo/Other        Arthritis     Other Findings              Physical Exam    Airway  Mallampati: II  TM Distance: 4 - 6 cm  Neck ROM: normal range of motion   Mouth opening: Normal     Cardiovascular  Regular rate and rhythm,  S1 and S2 normal,  no murmur, click, rub, or gallop             Dental    Dentition: Edentulous  Comments: Denture posts present   Pulmonary  Breath sounds clear to auscultation               Abdominal  GI exam deferred       Other Findings            Anesthetic Plan    ASA: 3  Anesthesia type: general          Induction: Intravenous  Anesthetic plan and risks discussed with: Patient

## 2018-05-15 NOTE — PERIOP NOTES
Arrived to Phase 2 - alert and oriented - assisted to BR with 2 staff and walker - voided 350 ml urine - no c/o offered

## 2018-07-09 NOTE — PROGRESS NOTES
Nursing Notes    Patient presents to the office today in follow-up. Patient rates her pain at 7/10 on the numerical pain scale. Reviewed medications with counts as follows:    Rx Date filled Qty Dispensed Pill Count Last Dose Short   Oxycodone 15 mg  07/01/18 120 87 This a.m. no   Methadone 10 mg 07/01/18 240 173 today no                           POC UDS was not performed in office today. Any new labs or imaging since last appointment? YES. Pt states she had some labs done with her pcp    Have you been to an emergency room (ER) or urgent care clinic since your last visit? NO            Have you been hospitalized since your last visit? NO     If yes, where, when, and reason for visit? Pt states that she had an annual stent replacement on both ureters done as an out pt    Have you seen or consulted any other health care providers outside of the [address] [place] [address]  since your last visit? YES     If yes, where, when, and reason for visit? Pcp, neurology, oncology, dermatology, cardiology    Ms. Mcduffie has a reminder for a \"due or due soon\" health maintenance. I have asked that she contact her primary care provider for follow-up on this health maintenance.       PHQ over the last two weeks 7/9/2018   Little interest or pleasure in doing things Nearly every day   Feeling down, depressed or hopeless Several days   Total Score PHQ 2 4     Provider made aware of the above score

## 2018-07-09 NOTE — PATIENT INSTRUCTIONS
Learning About Sleeping Well  What does sleeping well mean? Sleeping well means getting enough sleep. How much sleep is enough varies among people. The number of hours you sleep is not as important as how you feel when you wake up. If you do not feel refreshed, you probably need more sleep. Another sign of not getting enough sleep is feeling tired during the day. The average total nightly sleep time is 7½ to 8 hours. Healthy adults may need a little more or a little less than this. Why is getting enough sleep important? Getting enough quality sleep is a basic part of good health. When your sleep suffers, your mood and your thoughts can suffer too. You may find yourself feeling more grumpy or stressed. Not getting enough sleep also can lead to serious problems, including injury, accidents, anxiety, and depression. What might cause poor sleeping? Many things can cause sleep problems, including:  · Stress. Stress can be caused by fear about a single event, such as giving a speech. Or you may have ongoing stress, such as worry about work or school. · Depression, anxiety, and other mental or emotional conditions. · Changes in your sleep habits or surroundings. This includes changes that happen where you sleep, such as noise, light, or sleeping in a different bed. It also includes changes in your sleep pattern, such as having jet lag or working a late shift. · Health problems, such as pain, breathing problems, and restless legs syndrome. · Lack of regular exercise. How can you help yourself? Here are some tips that may help you sleep more soundly and wake up feeling more refreshed. Your sleeping area  · Use your bedroom only for sleeping and sex. A bit of light reading may help you fall asleep. But if it doesn't, do your reading elsewhere in the house. Don't watch TV in bed. · Be sure your bed is big enough to stretch out comfortably, especially if you have a sleep partner.   · Keep your bedroom quiet, dark, and cool. Use curtains, blinds, or a sleep mask to block out light. To block out noise, use earplugs, soothing music, or a \"white noise\" machine. Your evening and bedtime routine  · Create a relaxing bedtime routine. You might want to take a warm shower or bath, listen to soothing music, or drink a cup of noncaffeinated tea. · Go to bed at the same time every night. And get up at the same time every morning, even if you feel tired. What to avoid  · Limit caffeine (coffee, tea, caffeinated sodas) during the day, and don't have any for at least 4 to 6 hours before bedtime. · Don't drink alcohol before bedtime. Alcohol can cause you to wake up more often during the night. · Don't smoke or use tobacco, especially in the evening. Nicotine can keep you awake. · Don't take naps during the day, especially close to bedtime. · Don't lie in bed awake for too long. If you can't fall asleep, or if you wake up in the middle of the night and can't get back to sleep within 15 minutes or so, get out of bed and go to another room until you feel sleepy. · Don't take medicine right before bed that may keep you awake or make you feel hyper or energized. Your doctor can tell you if your medicine may do this and if you can take it earlier in the day. If you can't sleep  · Imagine yourself in a peaceful, pleasant scene. Focus on the details and feelings of being in a place that is relaxing. · Get up and do a quiet or boring activity until you feel sleepy. · Don't drink any liquids after 6 p.m. if you wake up often because you have to go to the bathroom. Where can you learn more? Go to http://matthew-ganesh.info/. Enter B436 in the search box to learn more about \"Learning About Sleeping Well. \"  Current as of: May 12, 2017  Content Version: 11.4  © 2344-4606 [employer].  Care instructions adapted under license by [employer] (which disclaims liability or warranty for this information). If you have questions about a medical condition or this instruction, always ask your healthcare professional. [address] any warranty or liability for your use of this information. Safe Use of Opioid Pain Medicine: Care Instructions  Your Care Instructions  Pain is your body's way of warning you that something is wrong. Pain feels different for everybody. Only you can describe your pain. A doctor can suggest or prescribe many types of medicines for pain. These range from over-the-counter medicines like acetaminophen (Tylenol) to powerful medicines called opioids. Examples of opioids are fentanyl, hydrocodone, morphine, and oxycodone. Heroin is an illegal opioid  Opioids are strong medicines. They can help you manage pain when you use them the right way. But if you misuse them, they can cause serious harm and even death. For these reasons, doctors are very careful about how they prescribe opioids. If you decide to take opioids, here are some things to remember. · Keep your doctor informed. You can get addicted to opioids. The risk is higher if you have a history of substance use. Your doctor will monitor you closely for signs of misuse and addiction and to figure out when you no longer need to take opioids. · Make a treatment plan. The goal of your plan is to be able to function and do the things you need to do, even if you still have some pain. You might be able to manage your pain with other non-opioid options like physical therapy, relaxation, or over-the-counter pain medicines. · Be aware of the side effects. Opioids can cause serious side effects, such as constipation, dry mouth, and nausea. And over time, you may need a higher dose to get pain relief. This is called tolerance. Your body also gets used to opioids. This is called physical dependence. If you suddenly stop taking them, you may have withdrawal symptoms.   The doctor carefully considered what pain medicine is right for you. You may not have received opioids if your doctor was concerned about drug interactions or your safety, or if he or she had other concerns. It is best to have one doctor or clinic treat your pain. This way you will get the pain medicine that will help you the most. And a doctor will be able to watch for any problems that the medicine might cause. The doctor has checked you carefully, but problems can develop later. If you notice any problems or new symptoms,  get medical treatment right away. Follow-up care is a key part of your treatment and safety. Be sure to make and go to all appointments, and call your doctor if you are having problems. It's also a good idea to know your test results and keep a list of the medicines you take. How can you care for yourself at home? · If you need to take opioids to manage your pain, remember these safety tips. ¨ Follow directions carefully. It's easy to misuse opioids if you take a dose other than what's prescribed by your doctor. This can lead to overdose and even death. Even sharing them with someone they weren't meant for is misuse. ¨ Be cautious. Opioids may affect your judgment and decision making. Do not drive or operate machinery until you can think clearly. Talk with your doctor about when it is safe to drive. ¨ Reduce the risk of drug interactions. Opioids can be dangerous if you take them with alcohol or with certain drugs like sleeping pills and muscle relaxers. Make sure your doctor knows about all the other medicines you take, including over-the-counter medicines. Don't start any new medicines before you talk to your doctor or pharmacist.  Sami Dye Keep others safe. Store opioids in a safe and secure place. Make sure that pets, children, friends, and family can't get to them. When you're done using opioids, make sure to properly dispose of them.  You can either use a community drug take-back program or your drugstore's mail-back program. If one of these programs isn't available, you can flush opioid skin patches and unused opioid pills down the toilet. ¨ Reduce the risk of overdose. Misuse of opioids can be very dangerous. Protect yourself by asking your doctor about a naloxone rescue kit. It can help you-and even save your life-if you take too much of an opioid. · Try other ways to reduce pain. ¨ Relax, and reduce stress. Relaxation techniques such as deep breathing or meditation can help. ¨ Keep moving. Gentle, daily exercise can help reduce pain over the long run. Try low- or no-impact exercises such as walking, swimming, and stationary biking. Do stretches to stay flexible. ¨ Try heat, cold packs, and massage. ¨ Get enough sleep. Pain can make you tired and drain your energy. Talk with your doctor if you have trouble sleeping because of pain. ¨ Think positive. Your thoughts can affect your pain level. Do things that you enjoy to distract yourself when you have pain instead of focusing on the pain. See a movie, read a book, listen to music, or spend time with a friend. · If you are not taking a prescription pain medicine, ask your doctor if you can take an over-the-counter medicine. When should you call for help? Call your doctor now or seek immediate medical care if:  ? · You have a new kind of pain. ? · You have new symptoms, such as a fever or rash, along with the pain. ? Watch closely for changes in your health, and be sure to contact your doctor if:  ? · You think you might be using too much pain medicine, and you need help to use less or stop. ? · Your pain gets worse. ? · You would like a referral to a doctor or clinic that specializes in pain management. Where can you learn more? Go to http://matthew-ganesh.info/. Enter R108 in the search box to learn more about \"Safe Use of Opioid Pain Medicine: Care Instructions. \"  Current as of: October 14, 2016  Content Version: 11.4  © 6902-8245 [employer]. Care instructions adapted under license by [employer] (which disclaims liability or warranty for this information). If you have questions about a medical condition or this instruction, always ask your healthcare professional. [address] any warranty or liability for your use of this information.

## 2018-07-09 NOTE — MR AVS SNAPSHOT
[address] [id number] [phone] Patient: Fannie Cespedes MRN: [id number] Visit Information Date & Time Provider Department Dept. Phone Encounter #  
 7/9/2018  8:30 AM Prosper Garcia NP [address] [place] [address] for Pain Management [phone] Follow-up Instructions Return in about 7 weeks (around 8/27/2018) for 30 mins. Upcoming Health Maintenance Date Due Hepatitis C Screening 1950 DTaP/Tdap/Td series (1 - Tdap) 10/30/1972 BREAST CANCER SCRN MAMMOGRAM 10/30/2001 FOBT Q 1 YEAR AGE 50-75 10/30/2001 ZOSTER VACCINE AGE 60> 8/30/2011 GLAUCOMA SCREENING Q2Y 10/30/2016 Bone Densitometry (Dexa) Screening 10/30/2016 Pneumococcal 65+ Low/Medium Risk (1 of 2 - PCV13) 10/30/2016 MEDICARE YEARLY EXAM 3/14/2018 Influenza Age 5 to Adult 8/1/2018 Allergies as of 7/9/2018  Review Complete On: 7/9/2018 By: Prosper Garcia NP Severity Noted Reaction Type Reactions Heparin Analogues  08/19/2014   Side Effect Other (comments) Opposite reaction to heparin Iodinated Contrast- Oral And Iv Dye    Hives Current Immunizations  Never Reviewed No immunizations on file. Not reviewed this visit You Were Diagnosed With   
  
 Codes Comments [facility]    -  Primary ICD-10-CM: G54.6 ICD-9-CM: 353.6 Encounter for long-term (current) use of high-risk medication     ICD-10-CM: Z79.899 ICD-9-CM: V58.69 Chronic pain syndrome     ICD-10-CM: G89.4 ICD-9-CM: 338.4 Neuropathic pain     ICD-10-CM: M79.2 ICD-9-CM: 729.2 Vitals BP Pulse Temp Resp Height(growth percentile) Weight(growth percentile) 119/55 (BP 1 Location: Right arm, BP Patient Position: Sitting) 62 97.2 °F (36.2 °C) (Oral) 14 5' 9\" (1.753 m) 166 lb (75.3 kg) BMI OB Status Smoking Status 24.51 kg/m2 Hysterectomy Former Smoker Vitals History BMI and BSA Data Body Mass Index Body Surface Area 24.51 kg/m 2 1.91 m 2 Preferred Pharmacy Pharmacy Name Phone [facility] [address], [address] [phone] Your Updated Medication List  
  
   
This list is accurate as of 7/9/18  9:41 AM.  Always use your most recent med list.  
  
  
  
  
 albuterol 90 mcg/actuation inhaler Commonly known as:  PROVENTIL HFA, VENTOLIN HFA, PROAIR HFA Take 2 Puffs by inhalation as needed for Wheezing. Indications: asthma  
  
 atorvastatin 80 mg tablet Commonly known as:  LIPITOR Take 80 mg by mouth nightly. Indications: HYPERCHOLESTEROLEMIA  
  
 carvedilol 12.5 mg tablet Commonly known as:  Jinx Re Take 6.25 mg by mouth two (2) times daily (with meals). Indications: hypertension, CHF  
  
 EFFEXOR  mg capsule Generic drug:  venlafaxine-SR Take 150 mg by mouth daily. enoxaparin 120 mg/0.8 mL injection Commonly known as:  LOVENOX  
120 mg by SubCUTAneous route daily. gabapentin 600 mg tablet Commonly known as:  NEURONTIN Take 600 mg by mouth three (3) times daily. Indications: NEUROPATHIC PAIN  
  
 isosorbide mononitrate ER 60 mg CR tablet Commonly known as:  IMDUR Take 60 mg by mouth every morning. lidocaine 5 % Commonly known as:  LIDODERM  
1 Patch by TransDERmal route every twenty-four (24) hours for 30 days. Apply patch to the affected area for 12 hours a day and remove for 12 hours a day. Indications: Pain  
  
 losartan 25 mg tablet Commonly known as:  COZAAR Take 25 mg by mouth daily. Indications: HYPERTENSION  
  
 * methadone 10 mg tablet Commonly known as:  DOLOPHINE Take 2 Tabs by mouth four (4) times daily for 30 days. Max Daily Amount: 80 mg. for chronic, severe, refractory pain. Indications: Chronic Pain, Severe Pain * methadone 10 mg tablet Commonly known as:  DOLOPHINE  
 Take 1-2 Tabs by mouth four (4) times daily for 30 days. Max Daily Amount: 70 mg  Indications: Chronic Pain, Severe Pain Start taking on:  2018  
  
 naloxone 4 mg/actuation nasal spray Commonly known as:  NARCAN  
4 mg by Nasal route once as needed (opioid overdose) for up to 1 dose. May substitute 2 mg syringe if insurance requires  
  
 omeprazole 20 mg capsule Commonly known as:  PRILOSEC Take 20 mg by mouth daily. Indications: GASTROESOPHAGEAL REFLUX  
  
 * oxyCODONE IR 15 mg immediate release tablet Commonly known as:  Cesilia Boers Take 1 Tab by mouth four (4) times daily as needed for Pain for up to 30 days. Max Daily Amount: 4 Tabs. May take 1/2 to 1 tab QID prn breakthrough pain  Indications: NEUROPATHIC PAIN, Pain * oxyCODONE IR 15 mg immediate release tablet Commonly known as:  OXY-IR Take 1 Tab by mouth every six (6) hours as needed for Pain for up to 30 days. Max Daily Amount: 60 mg. Indications: Pain Start taking on:  2018  
  
 polyvinyl alcohol 1.4 % ophthalmic solution Commonly known as:  Mari Rhein Administer 1 Drop to both eyes as needed. TENS Units Gattis Minium Commonly known as:  TENS 504 Please provide patient tens unit device and pads for to be used on affected area as needed. * Notice: This list has 4 medication(s) that are the same as other medications prescribed for you. Read the directions carefully, and ask your doctor or other care provider to review them with you. Prescriptions Printed Refills  
 lidocaine (LIDODERM) 5 % 0 Si Patch by TransDERmal route every twenty-four (24) hours for 30 days. Apply patch to the affected area for 12 hours a day and remove for 12 hours a day. Indications: Pain Class: Print Route: TransDERmal  
 methadone (DOLOPHINE) 10 mg tablet 0 Starting on: 2018 Sig: Take 1-2 Tabs by mouth four (4) times daily for 30 days.  Max Daily Amount: 70 mg  Indications: Chronic Pain, Severe Pain Class: Print  
 oxyCODONE IR (OXY-IR) 15 mg immediate release tablet 0 Starting on: 7/31/2018 Sig: Take 1 Tab by mouth every six (6) hours as needed for Pain for up to 30 days. Max Daily Amount: 60 mg. Indications: Pain Class: Print Route: Oral  
  
Prescriptions Sent to Pharmacy Refills TENS Units (TENS 504) shaka 0 Sig: Please provide patient tens unit device and pads for to be used on affected area as needed. Class: Normal  
 Pharmacy: [facility] [address], [address] [facility] #: [phone] Follow-up Instructions Return in about 7 weeks (around 8/27/2018) for 30 mins. Patient Instructions Learning About Sleeping Well What does sleeping well mean? Sleeping well means getting enough sleep. How much sleep is enough varies among people. The number of hours you sleep is not as important as how you feel when you wake up. If you do not feel refreshed, you probably need more sleep. Another sign of not getting enough sleep is feeling tired during the day. The average total nightly sleep time is 7½ to 8 hours. Healthy adults may need a little more or a little less than this. Why is getting enough sleep important? Getting enough quality sleep is a basic part of good health. When your sleep suffers, your mood and your thoughts can suffer too. You may find yourself feeling more grumpy or stressed. Not getting enough sleep also can lead to serious problems, including injury, accidents, anxiety, and depression. What might cause poor sleeping? Many things can cause sleep problems, including: · Stress. Stress can be caused by fear about a single event, such as giving a speech. Or you may have ongoing stress, such as worry about work or school. · Depression, anxiety, and other mental or emotional conditions. · Changes in your sleep habits or surroundings. This includes changes that happen where you sleep, such as noise, light, or sleeping in a different bed. It also includes changes in your sleep pattern, such as having jet lag or working a late shift. · Health problems, such as pain, breathing problems, and restless legs syndrome. · Lack of regular exercise. How can you help yourself? Here are some tips that may help you sleep more soundly and wake up feeling more refreshed. Your sleeping area · Use your bedroom only for sleeping and sex. A bit of light reading may help you fall asleep. But if it doesn't, do your reading elsewhere in the house. Don't watch TV in bed. · Be sure your bed is big enough to stretch out comfortably, especially if you have a sleep partner. · Keep your bedroom quiet, dark, and cool. Use curtains, blinds, or a sleep mask to block out light. To block out noise, use earplugs, soothing music, or a \"white noise\" machine. Your evening and bedtime routine · Create a relaxing bedtime routine. You might want to take a warm shower or bath, listen to soothing music, or drink a cup of noncaffeinated tea. · Go to bed at the same time every night. And get up at the same time every morning, even if you feel tired. What to avoid · Limit caffeine (coffee, tea, caffeinated sodas) during the day, and don't have any for at least 4 to 6 hours before bedtime. · Don't drink alcohol before bedtime. Alcohol can cause you to wake up more often during the night. · Don't smoke or use tobacco, especially in the evening. Nicotine can keep you awake. · Don't take naps during the day, especially close to bedtime. · Don't lie in bed awake for too long. If you can't fall asleep, or if you wake up in the middle of the night and can't get back to sleep within 15 minutes or so, get out of bed and go to another room until you feel sleepy.  
· Don't take medicine right before bed that may keep you awake or make you feel hyper or energized. Your doctor can tell you if your medicine may do this and if you can take it earlier in the day. If you can't sleep · Imagine yourself in a peaceful, pleasant scene. Focus on the details and feelings of being in a place that is relaxing. · Get up and do a quiet or boring activity until you feel sleepy. · Don't drink any liquids after 6 p.m. if you wake up often because you have to go to the bathroom. Where can you learn more? Go to http://matthew-ganesh.info/. Enter P047 in the search box to learn more about \"Learning About Sleeping Well. \" Current as of: May 12, 2017 Content Version: 11.4 © 8460-3793 [employer]. Care instructions adapted under license by [employer] (which disclaims liability or warranty for this information). If you have questions about a medical condition or this instruction, always ask your healthcare professional. [address] [address] any warranty or liability for your use of this information. Safe Use of Opioid Pain Medicine: Care Instructions Your Care Instructions Pain is your body's way of warning you that something is wrong. Pain feels different for everybody. Only you can describe your pain. A doctor can suggest or prescribe many types of medicines for pain. These range from over-the-counter medicines like acetaminophen (Tylenol) to powerful medicines called opioids. Examples of opioids are fentanyl, hydrocodone, morphine, and oxycodone. Heroin is an illegal opioid Opioids are strong medicines. They can help you manage pain when you use them the right way. But if you misuse them, they can cause serious harm and even death. For these reasons, doctors are very careful about how they prescribe opioids. If you decide to take opioids, here are some things to remember. · Keep your doctor informed. You can get addicted to opioids.  The risk is higher if you have a history of substance use. Your doctor will monitor you closely for signs of misuse and addiction and to figure out when you no longer need to take opioids. · Make a treatment plan. The goal of your plan is to be able to function and do the things you need to do, even if you still have some pain. You might be able to manage your pain with other non-opioid options like physical therapy, relaxation, or over-the-counter pain medicines. · Be aware of the side effects. Opioids can cause serious side effects, such as constipation, dry mouth, and nausea. And over time, you may need a higher dose to get pain relief. This is called tolerance. Your body also gets used to opioids. This is called physical dependence. If you suddenly stop taking them, you may have withdrawal symptoms. The doctor carefully considered what pain medicine is right for you. You may not have received opioids if your doctor was concerned about drug interactions or your safety, or if he or she had other concerns. It is best to have one doctor or clinic treat your pain. This way you will get the pain medicine that will help you the most. And a doctor will be able to watch for any problems that the medicine might cause. The doctor has checked you carefully, but problems can develop later. If you notice any problems or new symptoms,  get medical treatment right away. Follow-up care is a key part of your treatment and safety. Be sure to make and go to all appointments, and call your doctor if you are having problems. It's also a good idea to know your test results and keep a list of the medicines you take. How can you care for yourself at home? · If you need to take opioids to manage your pain, remember these safety tips. ¨ Follow directions carefully. It's easy to misuse opioids if you take a dose other than what's prescribed by your doctor.  This can lead to overdose and even death. Even sharing them with someone they weren't meant for is misuse. ¨ Be cautious. Opioids may affect your judgment and decision making. Do not drive or operate machinery until you can think clearly. Talk with your doctor about when it is safe to drive. ¨ Reduce the risk of drug interactions. Opioids can be dangerous if you take them with alcohol or with certain drugs like sleeping pills and muscle relaxers. Make sure your doctor knows about all the other medicines you take, including over-the-counter medicines. Don't start any new medicines before you talk to your doctor or pharmacist. 
Gera Laura Keep others safe. Store opioids in a safe and secure place. Make sure that pets, children, friends, and family can't get to them. When you're done using opioids, make sure to properly dispose of them. You can either use a community drug take-back program or your drugstore's mail-back program. If one of these programs isn't available, you can flush opioid skin patches and unused opioid pills down the toilet. ¨ Reduce the risk of overdose. Misuse of opioids can be very dangerous. Protect yourself by asking your doctor about a naloxone rescue kit. It can help you-and even save your life-if you take too much of an opioid. · Try other ways to reduce pain. ¨ Relax, and reduce stress. Relaxation techniques such as deep breathing or meditation can help. ¨ Keep moving. Gentle, daily exercise can help reduce pain over the long run. Try low- or no-impact exercises such as walking, swimming, and stationary biking. Do stretches to stay flexible. ¨ Try heat, cold packs, and massage. ¨ Get enough sleep. Pain can make you tired and drain your energy. Talk with your doctor if you have trouble sleeping because of pain. ¨ Think positive. Your thoughts can affect your pain level.  Do things that you enjoy to distract yourself when you have pain instead of focusing on the pain. See a movie, read a book, listen to music, or spend time with a friend. · If you are not taking a prescription pain medicine, ask your doctor if you can take an over-the-counter medicine. When should you call for help? Call your doctor now or seek immediate medical care if: 
? · You have a new kind of pain. ? · You have new symptoms, such as a fever or rash, along with the pain. ? Watch closely for changes in your health, and be sure to contact your doctor if: 
? · You think you might be using too much pain medicine, and you need help to use less or stop. ? · Your pain gets worse. ? · You would like a referral to a doctor or clinic that specializes in pain management. Where can you learn more? Go to http://matthew-ganesh.info/. Enter R108 in the search box to learn more about \"Safe Use of Opioid Pain Medicine: Care Instructions. \" Current as of: October 14, 2016 Content Version: 11.4 © 0695-8824 [employer]. Care instructions adapted under license by [employer] (which disclaims liability or warranty for this information). If you have questions about a medical condition or this instruction, always ask your healthcare professional. [address] any warranty or liability for your use of this information. Introducing [facility]! Dear Connor Sandoval: Thank you for requesting a [employer] account. Our records indicate that you already have an active Share[employer] account. You can access your account anytime at [email or web address] Did you know that you can access your hospital and ER discharge instructions at any time in Sharematic? You can also review all of your test results from your hospital stay or ER visit. Additional Information If you have questions, please visit the Frequently Asked Questions section of the [employer] website at [email or web address] Remember, [employer] is NOT to be used for urgent needs. For medical emergencies, dial 911. Now available from your iPhone and Android! Please provide this summary of care documentation to your next provider. Your primary care clinician is listed as Familia Martin. If you have any questions after today's visit, please call [phone].

## 2018-07-09 NOTE — PROGRESS NOTES
Referral date 9/2011, source PCP and for left leg pain. HPI:  Tonya Chase is a 78 y.o. female here for f/u visit for ongoing evaluation of Left leg pain S/P AKA. Pt was last seen here on 04/09/2018. Pt denies interval changes on the character or distribution of pain. Pain is located left S/P AKA . The pain is described as aching, burning, numbness, pins and needles, stabbing. Pain at its best is 4/10. Pain at its worse is 9/10. The pain is worsened by forget to take \"my pills\". Symptoms are relieved by oxycodone, methadone, laying down and rubbing area. Pt has tried compound cream, cold, hot with no perceived benefit. Pt has not tried lidocaine patches, acupuncture, TENS. Social History     Social History    Marital status:      Spouse name: N/A    Number of children: N/A    Years of education: N/A     Occupational History    Not on file.      Social History Main Topics    Smoking status: Former Smoker     Packs/day: 1.00     Years: 40.00     Quit date: 6/29/2016    Smokeless tobacco: Never Used    Alcohol use No    Drug use: No    Sexual activity: No     Other Topics Concern    Not on file     Social History Narrative     Family History   Problem Relation Age of Onset    Stroke Father     Heart Failure Father     Hypertension Mother     Hypertension Brother      Allergies   Allergen Reactions    Heparin Analogues Other (comments)     Opposite reaction to heparin    Iodinated Contrast- Oral And Iv Dye Hives     Past Medical History:   Diagnosis Date    Adverse effect of anesthesia     agitation    Adverse effect of anesthesia 2013    stopped breating during [facility]    Allergic rhinitis     Anemia 07/06/2017    requiring blood transfusions x 3 in the past few weeks due to hematuria    Anemia NEC     Aneurysm ([address] 2012    AAA repaired    Anxiety     Asthma     Chronic kidney disease 2015    right kidney blockage    Chronic kidney disease     per pt kidneys not working that well    Chronic pain     phantom leg pain    Depression     Diplopia     Diverticulosis     DVT [facility]deep venous thrombosis) [facility] [address] 2013    GERD (gastroesophageal reflux disease)     Glucose intolerance (impaired glucose tolerance)     HX OTHER MEDICAL     atheroscl art extrem intermit inessa    Hypertension 1990's    Hypokalemia     IL[facility]     Lower limb ischemia     Neuropathy     OA (osteoarthritis)     PAD [facility]peripheral artery disease) ([facility]     Paresthesia of left leg     Pleurisy     Polyarthralgia     Respiratory failure (HCC)     Tremor, essential     Urinary incontinence      Past Surgical History:   Procedure Laterality Date    HX AAA REPAIR  2012    bifurcation graft    HX ABOVE KNEE AMPUTATION Left 2012    HX CERVICAL LAMINECTOMY      HX CHOLECYSTECTOMY      HX GYN      hysterectomy    HX SALPINGO-OOPHORECTOMY      HX UROLOGICAL  2014, 2015    Cysto    HX UROLOGICAL      stents    VASCULAR SURGERY PROCEDURE UNLIST  2011 & 2012    bilateral stents femoral arteries     Current Outpatient Prescriptions on File Prior to Visit   Medication Sig    venlafaxine-SR (EFFEXOR XR) 150 mg capsule Take 150 mg by mouth daily.  oxyCODONE IR (ROXICODONE) 15 mg immediate release tablet Take 1 Tab by mouth four (4) times daily as needed for Pain for up to 30 days. Max Daily Amount: 4 Tabs. May take 1/2 to 1 tab QID prn breakthrough pain  Indications: NEUROPATHIC PAIN, Pain (Patient taking differently: Take 15 mg by mouth four (4) times daily as needed for Pain. May take 1/2 to 1 tab QID prn breakthrough pain. [id number]  Indications: NEUROPATHIC PAIN, Pain)    methadone (DOLOPHINE) 10 mg tablet Take 2 Tabs by mouth four (4) times daily for 30 days. Max Daily Amount: 80 mg. for chronic, severe, refractory pain. Indications: Chronic Pain, Severe Pain    enoxaparin (LOVENOX) 120 mg/0.8 mL injection 120 mg by SubCUTAneous route daily.     gabapentin (NEURONTIN) 600 mg tablet Take 600 mg by mouth three (3) times daily. Indications: NEUROPATHIC PAIN    isosorbide mononitrate ER (IMDUR) 60 mg CR tablet Take 60 mg by mouth every morning.  losartan (COZAAR) 25 mg tablet Take 25 mg by mouth daily. Indications: HYPERTENSION    carvedilol (COREG) 12.5 mg tablet Take 6.25 mg by mouth two (2) times daily (with meals). Indications: hypertension, CHF    atorvastatin (LIPITOR) 80 mg tablet Take 80 mg by mouth nightly. Indications: HYPERCHOLESTEROLEMIA    polyvinyl alcohol (LIQUIFILM TEARS) 1.4 % ophthalmic solution Administer 1 Drop to both eyes as needed.  albuterol (PROVENTIL HFA, VENTOLIN HFA, PROAIR HFA) 90 mcg/actuation inhaler Take 2 Puffs by inhalation as needed for Wheezing. Indications: asthma    omeprazole (PRILOSEC) 20 mg capsule Take 20 mg by mouth daily. Indications: GASTROESOPHAGEAL REFLUX    naloxone 4 mg/actuation spry 4 mg by Nasal route once as needed (opioid overdose) for up to 1 dose. May substitute 2 mg syringe if insurance requires     No current facility-administered medications on file prior to visit. Review of Systems   Constitutional: Negative for chills, fever, malaise/fatigue and weight loss. HENT: Negative for hearing loss. Eyes:        Vision problems has appointment in august    Respiratory: Positive for shortness of breath (occasional has pulmonologist ). Negative for cough and wheezing. Cardiovascular: Negative for chest pain and palpitations. Gastrointestinal: Negative for constipation, diarrhea, nausea and vomiting. Musculoskeletal: Positive for myalgias (left AKA). Psychiatric/Behavioral: Negative for depression and suicidal ideas (denies homicidal ideation). The patient is not nervous/anxious. Opioid specific risk: depression, GERD, asthma. Opioid specific history: concurrent use of opioids since 2011, with no opioid holiday.     Vitals:    07/09/18 0825   BP: 119/55   Pulse: 62   Resp: 14   Temp: 97.2 °F (36.2 °C)   TempSrc: Oral   Weight: 75.3 kg (166 lb)   Height: 5' 9\" (1.753 m)   PainSc:   7          EKG: \"\"\"\"\"2/9/18 EKG QT/QTc 431/443\"\"\"\"\"    PE:  Physical Exam    AFVSS, no acute distress, normal body habitus. A&OXs 3. Normocephalic, atraumatic. Conjugate gaze, clear sclerae. Speech is clear. Mood is appropriate for situation. Heart rate regular rate and rhythm, S1-S2 noted, no murmur noted. Lungs clear to auscultation bilaterally, nonlabored breathing, no acute distress. Left AKA scar noted clean, dry and intact. Left AKA skin warm, no erythema, no edema noted, no neuroma noted. Patient uses electric scooter for mobilization. LE:   Strength for right lower extremity is 5/5 for hip flexion, knee extension, dorsiflexion, extensor hallucis longus, plantar flexion. Strength for left lower extremity is 5/5 for hip flexion    Calculated MEQ - 330 decreased to 300 at this visit  Naloxone rescue - Yes  Prophylactic bowel program -no  Date of last OCA 1/10/18  Last UDS 4/9/18, consistent   date checked today, findings consistent    [address]  [phone]    Today        YADIRA -6 and 1    PGIC -at least 42% to questions not answered    COMM -10      PHQ -- .   PHQ over the last two weeks 7/9/2018   Little interest or pleasure in doing things Nearly every day   Feeling down, depressed or hopeless Several days   Total Score PHQ 2 4   Trouble falling or staying asleep, or sleeping too much Several days   Feeling tired or having little energy Several days   Poor appetite or overeating Not at all   Feeling bad about yourself - or that you are a failure or have let yourself or your family down Several days   Trouble concentrating on things such as school, work, reading or watching TV Several days   Moving or speaking so slowly that other people could have noticed; or the opposite being so fidgety that others notice Not at all Thoughts of being better off dead, or hurting yourself in some way Not at all   PHQ 9 Score 8   How difficult have these problems made it for you to do your work, take care of your home and get along with others Extremely difficult       DSM V-OUD Screen - deferred to next visit     Assessment/Plan:     ICD-10-CM ICD-9-CM    1. Phantom limb pain (HCC) G54.6 353.6 methadone (DOLOPHINE) 10 mg tablet      oxyCODONE IR (OXY-IR) 15 mg immediate release tablet      TENS Units (TENS 504) shaka   2. Encounter for long-term (current) use of high-risk medication Z79.899 V58.69    3. Chronic pain syndrome G89.4 338.4 methadone (DOLOPHINE) 10 mg tablet      oxyCODONE IR (OXY-IR) 15 mg immediate release tablet      TENS Units (TENS 504) shaka   4. Neuropathic pain M79.2 729.2 lidocaine (LIDODERM) 5 %      TENS Units (TENS 504) shaka        Ordered TENS unit to be applied to affected area daily as needed for pain. Lidocaine patches 5% to be applied to affected area for 12 hours a day and then remove for 12 hours pain. oxycodone IR 15 mg tablet, take 1 tablet by mouth 4 times daily as needed for pain. Ordered prescription to keep the same as weaning extended release opioid. methadone 10 mg tablet, take 2 tablets by mouth 4 times daily for pain. To be decreased to methadone 10 mg tablets take 1-2 tablets by mouth 4 times daily with a max dose of 4 tablets daily for pain. At next visit we will decrease to 6 tablets a day. And to continue to wean opioids by approximately 10% monthly with end goal of 0 opioids. With regards to patient's safety and tolerance. Patient to call if she experiences any withdrawal symptoms. Order placed for reach prosthesis orthotics for left transfemoral prosthetic assessment and adjustment. Patient to continue to regular follow-ups with pulmonologist and PCP    Patient to speak with PCP about increasing gabapentin dose. Consider pain psychology, CBT, physical therapy.     Follow up ongoing assessment and ongoing development of integrative and comprehensive plan of care for chronic pain. GOALS:  To establish complementary and integrative plan of care to address chronic pain issues while minimizing pharmaceuticals to maximize patient's function improve quality of life. Education:  Patient again educated on the importance of strict compliance with the opioid care agreement while on opioid therapy. Patient also again educated that they should avoid driving while on chronic opioid therapy. Also advised to avoid alcohol and to avoid benzodiazepines while on opioid therapy. Handouts given for opioid safety and sleep health. Patient Homework:  Continue to independently investigate yoga for persons with chronic pain. F/u:. Follow-up Disposition:  Return in about 7 weeks (around 8/27/2018) for 30 mins.

## 2018-08-24 NOTE — PATIENT INSTRUCTIONS
Learning About Sleeping Well  What does sleeping well mean? Sleeping well means getting enough sleep. How much sleep is enough varies among people. The number of hours you sleep is not as important as how you feel when you wake up. If you do not feel refreshed, you probably need more sleep. Another sign of not getting enough sleep is feeling tired during the day. The average total nightly sleep time is 7½ to 8 hours. Healthy adults may need a little more or a little less than this. Why is getting enough sleep important? Getting enough quality sleep is a basic part of good health. When your sleep suffers, your mood and your thoughts can suffer too. You may find yourself feeling more grumpy or stressed. Not getting enough sleep also can lead to serious problems, including injury, accidents, anxiety, and depression. What might cause poor sleeping? Many things can cause sleep problems, including:  · Stress. Stress can be caused by fear about a single event, such as giving a speech. Or you may have ongoing stress, such as worry about work or school. · Depression, anxiety, and other mental or emotional conditions. · Changes in your sleep habits or surroundings. This includes changes that happen where you sleep, such as noise, light, or sleeping in a different bed. It also includes changes in your sleep pattern, such as having jet lag or working a late shift. · Health problems, such as pain, breathing problems, and restless legs syndrome. · Lack of regular exercise. How can you help yourself? Here are some tips that may help you sleep more soundly and wake up feeling more refreshed. Your sleeping area  · Use your bedroom only for sleeping and sex. A bit of light reading may help you fall asleep. But if it doesn't, do your reading elsewhere in the house. Don't watch TV in bed. · Be sure your bed is big enough to stretch out comfortably, especially if you have a sleep partner.   · Keep your bedroom quiet, dark, and cool. Use curtains, blinds, or a sleep mask to block out light. To block out noise, use earplugs, soothing music, or a \"white noise\" machine. Your evening and bedtime routine  · Create a relaxing bedtime routine. You might want to take a warm shower or bath, listen to soothing music, or drink a cup of noncaffeinated tea. · Go to bed at the same time every night. And get up at the same time every morning, even if you feel tired. What to avoid  · Limit caffeine (coffee, tea, caffeinated sodas) during the day, and don't have any for at least 4 to 6 hours before bedtime. · Don't drink alcohol before bedtime. Alcohol can cause you to wake up more often during the night. · Don't smoke or use tobacco, especially in the evening. Nicotine can keep you awake. · Don't take naps during the day, especially close to bedtime. · Don't lie in bed awake for too long. If you can't fall asleep, or if you wake up in the middle of the night and can't get back to sleep within 15 minutes or so, get out of bed and go to another room until you feel sleepy. · Don't take medicine right before bed that may keep you awake or make you feel hyper or energized. Your doctor can tell you if your medicine may do this and if you can take it earlier in the day. If you can't sleep  · Imagine yourself in a peaceful, pleasant scene. Focus on the details and feelings of being in a place that is relaxing. · Get up and do a quiet or boring activity until you feel sleepy. · Don't drink any liquids after 6 p.m. if you wake up often because you have to go to the bathroom. Where can you learn more? Go to http://matthew-ganesh.info/. Enter A783 in the search box to learn more about \"Learning About Sleeping Well. \"  Current as of: December 7, 2017  Content Version: 11.7  © 9582-5723 [employer], [employer].  Care instructions adapted under license by "[employer]" (which disclaims liability or warranty for this information). If you have questions about a medical condition or this instruction, always ask your healthcare professional. [address] any warranty or liability for your use of this information. Safe Use of Opioid Pain Medicine: Care Instructions  Your Care Instructions  Pain is your body's way of warning you that something is wrong. Pain feels different for everybody. Only you can describe your pain. A doctor can suggest or prescribe many types of medicines for pain. These range from over-the-counter medicines like acetaminophen (Tylenol) to powerful medicines called opioids. Examples of opioids are fentanyl, hydrocodone, morphine, and oxycodone. Heroin is an illegal opioid  Opioids are strong medicines. They can help you manage pain when you use them the right way. But if you misuse them, they can cause serious harm and even death. For these reasons, doctors are very careful about how they prescribe opioids. If you decide to take opioids, here are some things to remember. · Keep your doctor informed. You can get addicted to opioids. The risk is higher if you have a history of substance use. Your doctor will monitor you closely for signs of misuse and addiction and to figure out when you no longer need to take opioids. · Make a treatment plan. The goal of your plan is to be able to function and do the things you need to do, even if you still have some pain. You might be able to manage your pain with other non-opioid options like physical therapy, relaxation, or over-the-counter pain medicines. · Be aware of the side effects. Opioids can cause serious side effects, such as constipation, dry mouth, and nausea. And over time, you may need a higher dose to get pain relief. This is called tolerance. Your body also gets used to opioids. This is called physical dependence. If you suddenly stop taking them, you may have withdrawal symptoms.   The doctor carefully considered what pain medicine is right for you. You may not have received opioids if your doctor was concerned about drug interactions or your safety, or if he or she had other concerns. It is best to have one doctor or clinic treat your pain. This way you will get the pain medicine that will help you the most. And a doctor will be able to watch for any problems that the medicine might cause. The doctor has checked you carefully, but problems can develop later. If you notice any problems or new symptoms,  get medical treatment right away. Follow-up care is a key part of your treatment and safety. Be sure to make and go to all appointments, and call your doctor if you are having problems. It's also a good idea to know your test results and keep a list of the medicines you take. How can you care for yourself at home? · If you need to take opioids to manage your pain, remember these safety tips. ¨ Follow directions carefully. It's easy to misuse opioids if you take a dose other than what's prescribed by your doctor. This can lead to overdose and even death. Even sharing them with someone they weren't meant for is misuse. ¨ Be cautious. Opioids may affect your judgment and decision making. Do not drive or operate machinery until you can think clearly. Talk with your doctor about when it is safe to drive. ¨ Reduce the risk of drug interactions. Opioids can be dangerous if you take them with alcohol or with certain drugs like sleeping pills and muscle relaxers. Make sure your doctor knows about all the other medicines you take, including over-the-counter medicines. Don't start any new medicines before you talk to your doctor or pharmacist.  Kerwin Sicard Keep others safe. Store opioids in a safe and secure place. Make sure that pets, children, friends, and family can't get to them. When you're done using opioids, make sure to properly dispose of them.  You can either use a community drug take-back program or your drugstore's mail-back program. If one of these programs isn't available, you can flush opioid skin patches and unused opioid pills down the toilet. ¨ Reduce the risk of overdose. Misuse of opioids can be very dangerous. Protect yourself by asking your doctor about a naloxone rescue kit. It can help you-and even save your life-if you take too much of an opioid. · Try other ways to reduce pain. ¨ Relax, and reduce stress. Relaxation techniques such as deep breathing or meditation can help. ¨ Keep moving. Gentle, daily exercise can help reduce pain over the long run. Try low- or no-impact exercises such as walking, swimming, and stationary biking. Do stretches to stay flexible. ¨ Try heat, cold packs, and massage. ¨ Get enough sleep. Pain can make you tired and drain your energy. Talk with your doctor if you have trouble sleeping because of pain. ¨ Think positive. Your thoughts can affect your pain level. Do things that you enjoy to distract yourself when you have pain instead of focusing on the pain. See a movie, read a book, listen to music, or spend time with a friend. · If you are not taking a prescription pain medicine, ask your doctor if you can take an over-the-counter medicine. When should you call for help? Call your doctor now or seek immediate medical care if:    · You have a new kind of pain.     · You have new symptoms, such as a fever or rash, along with the pain.    Watch closely for changes in your health, and be sure to contact your doctor if:    · You think you might be using too much pain medicine, and you need help to use less or stop.     · Your pain gets worse.     · You would like a referral to a doctor or clinic that specializes in pain management. Where can you learn more? Go to http://matthew-ganesh.info/. Enter R108 in the search box to learn more about \"Safe Use of Opioid Pain Medicine: Care Instructions. \"  Current as of: September 10, 2017  Content Version: 11.7  © 3271-4854 [employer]. Care instructions adapted under license by [employer] (which disclaims liability or warranty for this information). If you have questions about a medical condition or this instruction, always ask your healthcare professional. [address] any warranty or liability for your use of this information.

## 2018-08-24 NOTE — MR AVS SNAPSHOT
[address] [id number] 
[phone] Patient: Eleazar Stack MRN: [id number] DBS:[id number] Visit Information Date & Time Provider Department Dept. Phone Encounter #  
 8/24/2018  3:00 PM Rocky Fiore NP [address] [facility] [id number] Follow-up Instructions Return in about 3 months (around 11/24/2018). Upcoming Health Maintenance Date Due Hepatitis C Screening 1950 DTaP/Tdap/Td series (1 - Tdap) 10/30/1972 BREAST CANCER SCRN MAMMOGRAM 10/30/2001 FOBT Q 1 YEAR AGE 50-75 10/30/2001 ZOSTER VACCINE AGE 60> 8/30/2011 GLAUCOMA SCREENING Q2Y 10/30/2016 Bone Densitometry (Dexa) Screening 10/30/2016 Pneumococcal 65+ Low/Medium Risk (1 of 2 - PCV13) 10/30/2016 MEDICARE YEARLY EXAM 3/14/2018 Influenza Age 5 to Adult 8/1/2018 Allergies as of 8/24/2018  Review Complete On: 8/24/2018 By: Rocky Fiore NP Severity Noted Reaction Type Reactions Heparin Analogues  08/19/2014   Side Effect Other (comments) Opposite reaction to heparin Iodinated Contrast- Oral And Iv Dye    Hives Current Immunizations  Never Reviewed No immunizations on file. Not reviewed this visit You Were Diagnosed With   
  
 Codes Comments Phantom limb pain ([facility] [address]    -  Primary ICD-10-CM: G54.6 ICD-9-CM: 353.6 Encounter for long-term (current) use of high-risk medication     ICD-10-CM: Z79.899 ICD-9-CM: V58.69 Chronic pain syndrome     ICD-10-CM: G89.4 ICD-9-CM: 338.4 Neuropathic pain     ICD-10-CM: M79.2 ICD-9-CM: 729.2 Vitals BP Pulse Temp Resp Height(growth percentile) Weight(growth percentile) 119/55 (BP 1 Location: Right arm, BP Patient Position: Sitting) 62 98.6 °F (37 °C) (Oral) 18 5' 9\" (1.753 m) 166 lb (75.3 kg) BMI OB Status Smoking Status 24.51 kg/m2 Hysterectomy Former Smoker BMI and BSA Data Body Mass Index Body Surface Area 24.51 kg/m 2 1.91 m 2 Preferred Pharmacy Pharmacy Name Phone [facility] [address], [address] [place] [address] [phone] Your Updated Medication List  
  
   
This list is accurate as of 8/24/18  5:17 PM.  Always use your most recent med list.  
  
  
  
  
 albuterol 90 mcg/actuation inhaler Commonly known as:  PROVENTIL HFA, VENTOLIN HFA, PROAIR HFA Take 2 Puffs by inhalation as needed for Wheezing. Indications: asthma  
  
 atorvastatin 80 mg tablet Commonly known as:  LIPITOR Take 80 mg by mouth nightly. Indications: HYPERCHOLESTEROLEMIA  
  
 carvedilol 12.5 mg tablet Commonly known as:  Macel Abram Take 6.25 mg by mouth two (2) times daily (with meals). Indications: hypertension, CHF  
  
 EFFEXOR  mg capsule Generic drug:  venlafaxine-SR Take 150 mg by mouth daily. enoxaparin 120 mg/0.8 mL injection Commonly known as:  LOVENOX  
120 mg by SubCUTAneous route daily. gabapentin 600 mg tablet Commonly known as:  NEURONTIN Take 600 mg by mouth three (3) times daily. Indications: NEUROPATHIC PAIN  
  
 isosorbide mononitrate ER 60 mg CR tablet Commonly known as:  IMDUR Take 60 mg by mouth every morning. losartan 25 mg tablet Commonly known as:  COZAAR Take 25 mg by mouth daily. Indications: HYPERTENSION  
  
 methadone 10 mg tablet Commonly known as:  DOLOPHINE Take 1-2 Tabs by mouth four (4) times daily for 30 days. Max Daily Amount: 7 TABS  Indications: Chronic Pain, Severe Pain Start taking on:  8/31/2018  
  
 naloxone 4 mg/actuation nasal spray Commonly known as:  NARCAN  
4 mg by Nasal route once as needed (opioid overdose) for up to 1 dose. May substitute 2 mg syringe if insurance requires  
  
 omeprazole 20 mg capsule Commonly known as:  PRILOSEC Take 20 mg by mouth daily. Indications: GASTROESOPHAGEAL REFLUX oxyCODONE IR 15 mg immediate release tablet Commonly known as:  OXY-IR Take 1 Tab by mouth every six (6) hours as needed for Pain for up to 30 days. Max Daily Amount: 60 mg. Indications: Pain Start taking on:  8/31/2018  
  
 polyvinyl alcohol 1.4 % ophthalmic solution Commonly known as:  Lili Babinski Administer 1 Drop to both eyes as needed. TENS Units Excell Drape Commonly known as:  TENS 504 Please provide patient tens unit device and pads for to be used on affected area as needed. Prescriptions Printed Refills  
 oxyCODONE IR (OXY-IR) 15 mg immediate release tablet 0 Starting on: 8/31/2018 Sig: Take 1 Tab by mouth every six (6) hours as needed for Pain for up to 30 days. Max Daily Amount: 60 mg. Indications: Pain Class: Print Route: Oral  
 methadone (DOLOPHINE) 10 mg tablet 0 Starting on: 8/31/2018 Sig: Take 1-2 Tabs by mouth four (4) times daily for 30 days. Max Daily Amount: 7 TABS  Indications: Chronic Pain, Severe Pain Class: Print Follow-up Instructions Return in about 3 months (around 11/24/2018). Patient Instructions Learning About Sleeping Well What does sleeping well mean? Sleeping well means getting enough sleep. How much sleep is enough varies among people. The number of hours you sleep is not as important as how you feel when you wake up. If you do not feel refreshed, you probably need more sleep. Another sign of not getting enough sleep is feeling tired during the day. The average total nightly sleep time is 7½ to 8 hours. Healthy adults may need a little more or a little less than this. Why is getting enough sleep important? Getting enough quality sleep is a basic part of good health. When your sleep suffers, your mood and your thoughts can suffer too. You may find yourself feeling more grumpy or stressed.  Not getting enough sleep also can lead to serious problems, including injury, accidents, anxiety, and depression. What might cause poor sleeping? Many things can cause sleep problems, including: · Stress. Stress can be caused by fear about a single event, such as giving a speech. Or you may have ongoing stress, such as worry about work or school. · Depression, anxiety, and other mental or emotional conditions. · Changes in your sleep habits or surroundings. This includes changes that happen where you sleep, such as noise, light, or sleeping in a different bed. It also includes changes in your sleep pattern, such as having jet lag or working a late shift. · Health problems, such as pain, breathing problems, and restless legs syndrome. · Lack of regular exercise. How can you help yourself? Here are some tips that may help you sleep more soundly and wake up feeling more refreshed. Your sleeping area · Use your bedroom only for sleeping and sex. A bit of light reading may help you fall asleep. But if it doesn't, do your reading elsewhere in the house. Don't watch TV in bed. · Be sure your bed is big enough to stretch out comfortably, especially if you have a sleep partner. · Keep your bedroom quiet, dark, and cool. Use curtains, blinds, or a sleep mask to block out light. To block out noise, use earplugs, soothing music, or a \"white noise\" machine. Your evening and bedtime routine · Create a relaxing bedtime routine. You might want to take a warm shower or bath, listen to soothing music, or drink a cup of noncaffeinated tea. · Go to bed at the same time every night. And get up at the same time every morning, even if you feel tired. What to avoid · Limit caffeine (coffee, tea, caffeinated sodas) during the day, and don't have any for at least 4 to 6 hours before bedtime. · Don't drink alcohol before bedtime. Alcohol can cause you to wake up more often during the night. · Don't smoke or use tobacco, especially in the evening. Nicotine can keep you awake. · Don't take naps during the day, especially close to bedtime. · Don't lie in bed awake for too long. If you can't fall asleep, or if you wake up in the middle of the night and can't get back to sleep within 15 minutes or so, get out of bed and go to another room until you feel sleepy. · Don't take medicine right before bed that may keep you awake or make you feel hyper or energized. Your doctor can tell you if your medicine may do this and if you can take it earlier in the day. If you can't sleep · Imagine yourself in a peaceful, pleasant scene. Focus on the details and feelings of being in a place that is relaxing. · Get up and do a quiet or boring activity until you feel sleepy. · Don't drink any liquids after 6 p.m. if you wake up often because you have to go to the bathroom. Where can you learn more? Go to http://matthewLendstarganesh.info/. Enter A073 in the search box to learn more about \"Learning About Sleeping Well. \" Current as of: December 7, 2017 Content Version: 11.7 © 8275-0064 "[employer] Care instructions adapted under license by [employer] (which disclaims liability or warranty for this information). If you have questions about a medical condition or this instruction, always ask your healthcare professional. [address] [address] any warranty or liability for your use of this information. Safe Use of Opioid Pain Medicine: Care Instructions Your Care Instructions Pain is your body's way of warning you that something is wrong. Pain feels different for everybody. Only you can describe your pain. A doctor can suggest or prescribe many types of medicines for pain. These range from over-the-counter medicines like acetaminophen (Tylenol) to powerful medicines called opioids.  Examples of opioids are fentanyl, hydrocodone, morphine, and oxycodone. Heroin is an illegal opioid Opioids are strong medicines. They can help you manage pain when you use them the right way. But if you misuse them, they can cause serious harm and even death. For these reasons, doctors are very careful about how they prescribe opioids. If you decide to take opioids, here are some things to remember. · Keep your doctor informed. You can get addicted to opioids. The risk is higher if you have a history of substance use. Your doctor will monitor you closely for signs of misuse and addiction and to figure out when you no longer need to take opioids. · Make a treatment plan. The goal of your plan is to be able to function and do the things you need to do, even if you still have some pain. You might be able to manage your pain with other non-opioid options like physical therapy, relaxation, or over-the-counter pain medicines. · Be aware of the side effects. Opioids can cause serious side effects, such as constipation, dry mouth, and nausea. And over time, you may need a higher dose to get pain relief. This is called tolerance. Your body also gets used to opioids. This is called physical dependence. If you suddenly stop taking them, you may have withdrawal symptoms. The doctor carefully considered what pain medicine is right for you. You may not have received opioids if your doctor was concerned about drug interactions or your safety, or if he or she had other concerns. It is best to have one doctor or clinic treat your pain. This way you will get the pain medicine that will help you the most. And a doctor will be able to watch for any problems that the medicine might cause. The doctor has checked you carefully, but problems can develop later. If you notice any problems or new symptoms,  get medical treatment right away. Follow-up care is a key part of your treatment and safety.  Be sure to make and go to all appointments, and call your doctor if you are having problems. It's also a good idea to know your test results and keep a list of the medicines you take. How can you care for yourself at home? · If you need to take opioids to manage your pain, remember these safety tips. ¨ Follow directions carefully. It's easy to misuse opioids if you take a dose other than what's prescribed by your doctor. This can lead to overdose and even death. Even sharing them with someone they weren't meant for is misuse. ¨ Be cautious. Opioids may affect your judgment and decision making. Do not drive or operate machinery until you can think clearly. Talk with your doctor about when it is safe to drive. ¨ Reduce the risk of drug interactions. Opioids can be dangerous if you take them with alcohol or with certain drugs like sleeping pills and muscle relaxers. Make sure your doctor knows about all the other medicines you take, including over-the-counter medicines. Don't start any new medicines before you talk to your doctor or pharmacist. 
Kerwin Sicard Keep others safe. Store opioids in a safe and secure place. Make sure that pets, children, friends, and family can't get to them. When you're done using opioids, make sure to properly dispose of them. You can either use a community drug take-back program or your drugstore's mail-back program. If one of these programs isn't available, you can flush opioid skin patches and unused opioid pills down the toilet. ¨ Reduce the risk of overdose. Misuse of opioids can be very dangerous. Protect yourself by asking your doctor about a naloxone rescue kit. It can help you-and even save your life-if you take too much of an opioid. · Try other ways to reduce pain. ¨ Relax, and reduce stress. Relaxation techniques such as deep breathing or meditation can help. ¨ Keep moving.  Gentle, daily exercise can help reduce pain over the long run. Try low- or no-impact exercises such as walking, swimming, and stationary biking. Do stretches to stay flexible. ¨ Try heat, cold packs, and massage. ¨ Get enough sleep. Pain can make you tired and drain your energy. Talk with your doctor if you have trouble sleeping because of pain. ¨ Think positive. Your thoughts can affect your pain level. Do things that you enjoy to distract yourself when you have pain instead of focusing on the pain. See a movie, read a book, listen to music, or spend time with a friend. · If you are not taking a prescription pain medicine, ask your doctor if you can take an over-the-counter medicine. When should you call for help? Call your doctor now or seek immediate medical care if: 
  · You have a new kind of pain.  
  · You have new symptoms, such as a fever or rash, along with the pain.  
 Watch closely for changes in your health, and be sure to contact your doctor if: 
  · You think you might be using too much pain medicine, and you need help to use less or stop.  
  · Your pain gets worse.  
  · You would like a referral to a doctor or clinic that specializes in pain management. Where can you learn more? Go to http://matthew-ganesh.info/. Enter R108 in the search box to learn more about \"Safe Use of Opioid Pain Medicine: Care Instructions. \" Current as of: September 10, 2017 Content Version: 11.7 © 9652-1822 Personal. Care instructions adapted under license by [employer] (which disclaims liability or warranty for this information). If you have questions about a medical condition or this instruction, always ask your healthcare professional. [address] any warranty or liability for your use of this information. Introducing \[facility] [place] [facility]\"" & HEALTH SERVICES! Dear Adiel Armenta: Thank you for requesting a [employer] account. Our records indicate that you already have an active [employer] account.   You can access your account anytime at [email or web address] Did you know that you can access your hospital and ER discharge instructions at any time in [employer]? You can also review all of your test results from your hospital stay or ER visit. Additional Information If you have questions, please visit the Frequently Asked Questions section of the [employer] website at [email or web address] Remember, [employer] is NOT to be used for urgent needs. For medical emergencies, dial 911. Now available from your iPhone and Android! Please provide this summary of care documentation to your next provider. Your primary care clinician is listed as lOga Murcia. If you have any questions after today's visit, please call [phone].

## 2018-08-24 NOTE — PROGRESS NOTES
Nursing Notes    Patient presents to the office today in follow-up. Patient rates her pain at 7/10 on the numerical pain scale. Reviewed medications with counts as follows:    Rx Date filled Qty Dispensed Pill Count Last Dose Short   Oxycodone 15 mg 08/01/18 120 30 This a.m no   Methadone 10 mg 08/01/18 210 60 This a.m no                                POC UDS was not performed in office today    Any new labs or imaging since last appointment? NO    Have you been to an emergency room (ER) or urgent care clinic since your last visit? NO            Have you been hospitalized since your last visit? NO     If yes, where, when, and reason for visit? Have you seen or consulted any other health care providers outside of the [facility]  since your last visit? NO     If yes, where, when, and reason for visit? Ms. Jeanne Donato has a reminder for a \"due or due soon\" health maintenance. I have asked that she contact her primary care provider for follow-up on this health maintenance. Patient stated that she does have Narcan.   PHQ over the last two weeks 8/24/2018   Little interest or pleasure in doing things Several days   Feeling down, depressed, irritable, or hopeless Several days   Total Score PHQ 2 2   Trouble falling or staying asleep, or sleeping too much -   Feeling tired or having little energy -   Poor appetite, weight loss, or overeating -   Feeling bad about yourself - or that you are a failure or have let yourself or your family down -   Trouble concentrating on things such as school, work, reading, or watching TV -   Moving or speaking so slowly that other people could have noticed; or the opposite being so fidgety that others notice -   Thoughts of being better off dead, or hurting yourself in some way -   PHQ 9 Score -   How difficult have these problems made it for you to do your work, take care of your home and get along with others -

## 2018-08-24 NOTE — PROGRESS NOTES
Referral date 9/2011, source PCP and for left leg pain. HPI:  Justine Hatchet is a 78 y.o. female here for f/u visit for ongoing evaluation of Left leg pain S/P AKA. Pt was last seen here on 04/09/2018. Pt denies interval changes on the character or distribution of pain. Pain is located left S/P AKA . The pain is described as aching, burning, numbness, pins and needles, stabbing. Pain at its best is 4/10. Pain at its worse is 9/10. The pain is worsened by forget to take \"my pills\". Symptoms are relieved by oxycodone, methadone, laying down and rubbing area. Pt has tried compound cream, cold, hot with no perceived benefit. Pt has not tried lidocaine patches, acupuncture, TENS. Pt has upcoming heart cath. Social History     Social History    Marital status:      Spouse name: N/A    Number of children: N/A    Years of education: N/A     Occupational History    Not on file.      Social History Main Topics    Smoking status: Former Smoker     Packs/day: 1.00     Years: 40.00     Quit date: 6/29/2016    Smokeless tobacco: Never Used    Alcohol use No    Drug use: No    Sexual activity: No     Other Topics Concern    Not on file     Social History Narrative     Family History   Problem Relation Age of Onset    Stroke Father     Heart Failure Father     Hypertension Mother     Hypertension Brother      Allergies   Allergen Reactions    Heparin Analogues Other (comments)     Opposite reaction to heparin    Iodinated Contrast- Oral And Iv Dye Hives     Past Medical History:   Diagnosis Date    Adverse effect of anesthesia     agitation    Adverse effect of anesthesia 2013    stopped breating during surgery at [facility]    Allergic rhinitis     Anemia 07/06/2017    requiring blood transfusions x 3 in the past few weeks due to hematuria    Anemia NEC     Aneurysm ([facility] [address] 2012    AAA repaired    Anxiety     Asthma     Chronic kidney disease 2015    right kidney blockage    Chronic kidney disease per pt kidneys not working that well    Chronic pain     phantom leg pain    Depression     Diplopia     Diverticulosis     DVT (deep venous thrombosis) ([facility] [address] 2013    GERD (gastroesophageal reflux disease)     Glucose intolerance (impaired glucose tolerance)     HX OTHER MEDICAL     atheroscl art extrem intermit inessa    Hypertension 1990's    Hypokalemia     ILD (interstitial lung disease) ([facility] [address]     Lower limb ischemia     Neuropathy     OA (osteoarthritis)     [facility]     Paresthesia of left leg     Pleurisy     Polyarthralgia     Respiratory failure ([facility])     Tremor, essential     Urinary incontinence      Past Surgical History:   Procedure Laterality Date    HX AAA REPAIR  2012    bifurcation graft    HX ABOVE KNEE AMPUTATION Left 2012    HX CERVICAL LAMINECTOMY      HX CHOLECYSTECTOMY      HX GYN      hysterectomy    HX SALPINGO-OOPHORECTOMY      HX UROLOGICAL  2014, 2015    Cysto    HX UROLOGICAL      stents    VASCULAR SURGERY PROCEDURE UNLIST  2011 & 2012    bilateral stents femoral arteries     Current Outpatient Prescriptions on File Prior to Visit   Medication Sig    TENS Units (TENS 504) shaka Please provide patient tens unit device and pads for to be used on affected area as needed.  venlafaxine-SR (EFFEXOR XR) 150 mg capsule Take 150 mg by mouth daily.  enoxaparin (LOVENOX) 120 mg/0.8 mL injection 120 mg by SubCUTAneous route daily.  gabapentin (NEURONTIN) 600 mg tablet Take 600 mg by mouth three (3) times daily. Indications: NEUROPATHIC PAIN    isosorbide mononitrate ER (IMDUR) 60 mg CR tablet Take 60 mg by mouth every morning.  naloxone 4 mg/actuation spry 4 mg by Nasal route once as needed (opioid overdose) for up to 1 dose. May substitute 2 mg syringe if insurance requires    losartan (COZAAR) 25 mg tablet Take 25 mg by mouth daily.  Indications: HYPERTENSION    carvedilol (COREG) 12.5 mg tablet Take 6.25 mg by mouth two (2) times daily (with meals). Indications: hypertension, CHF    atorvastatin (LIPITOR) 80 mg tablet Take 80 mg by mouth nightly. Indications: HYPERCHOLESTEROLEMIA    polyvinyl alcohol (LIQUIFILM TEARS) 1.4 % ophthalmic solution Administer 1 Drop to both eyes as needed.  albuterol (PROVENTIL HFA, VENTOLIN HFA, PROAIR HFA) 90 mcg/actuation inhaler Take 2 Puffs by inhalation as needed for Wheezing. Indications: asthma    omeprazole (PRILOSEC) 20 mg capsule Take 20 mg by mouth daily. Indications: GASTROESOPHAGEAL REFLUX     No current facility-administered medications on file prior to visit. Review of Systems   Patient denies fever, chills, nausea, vomiting, constipation, chest pain, abdominal pain, weakness, trouble swallowing, changes in vision, changes in hearing, falls, dizziness, bladder accidents, bladder accident, bowel accidents, anxiety, thoughts to harm self, thoughts to harm others, alcohol use  Positive findings include diarrhea, shortness of breath, depression    Opioid specific risk: depression, GERD, asthma. Opioid specific history: concurrent use of opioids since 2011, with no opioid holiday. Vitals:    08/24/18 1554   BP: 119/55   Pulse: 62   Resp: 18   Temp: 98.6 °F (37 °C)   TempSrc: Oral   Weight: 75.3 kg (166 lb)   Height: 5' 9\" (1.753 m)   PainSc:   7   PainLoc: Leg          EKG: \"\"\"\"\"2/9/18 EKG QT/QTc 431/443\"\"\"\"\"      PE:  Physical Exam    AFVSS, no acute distress, normal body habitus. A&OXs 3. Normocephalic, atraumatic. Conjugate gaze, clear sclerae. Speech is clear. Mood is appropriate for situation. Heart rate regular rate and rhythm, S1-S2 noted, no murmur noted. Lungs clear to auscultation bilaterally, nonlabored breathing, no acute distress. Left AKA scar noted clean, dry and intact. Left AKA skin warm, no erythema, no edema noted, no neuroma noted. Patient uses electric scooter for mobilization.   LE:   Strength for right lower extremity is 5/5 for hip flexion, knee extension, dorsiflexion, extensor hallucis longus, plantar flexion. Strength for left lower extremity is 5/5 for hip flexion    Calculated MEQ - 300  Naloxone rescue - Yes  Prophylactic bowel program -no  Date of last OCA 1/10/18  Last UDS 4/9/18, consistent   date checked today, findings consistent    Primary Care Physician  Bert Mc  [facility]  [address]  [phone]    Today   last visit     YADIRA -62%  at least 42% to questions not answered       PGIC -6 and 2  6 and 1     COMM -7  10      PHQ -- . PHQ over the last two weeks 8/24/2018   Little interest or pleasure in doing things Several days   Feeling down, depressed, irritable, or hopeless Several days   Total Score PHQ 2 2   Trouble falling or staying asleep, or sleeping too much -   Feeling tired or having little energy -   Poor appetite, weight loss, or overeating -   Feeling bad about yourself - or that you are a failure or have let yourself or your family down -   Trouble concentrating on things such as school, work, reading, or watching TV -   Moving or speaking so slowly that other people could have noticed; or the opposite being so fidgety that others notice -   Thoughts of being better off dead, or hurting yourself in some way -   PHQ 9 Score -   How difficult have these problems made it for you to do your work, take care of your home and get along with others -       DSM V-OUD Screen - deferred to next visit     Assessment/Plan:     ICD-10-CM ICD-9-CM    1. Phantom limb pain (HCC) G54.6 353.6 oxyCODONE IR (OXY-IR) 15 mg immediate release tablet      methadone (DOLOPHINE) 10 mg tablet   2. Encounter for long-term (current) use of high-risk medication Z79.899 V58.69    3. Chronic pain syndrome G89.4 338.4 oxyCODONE IR (OXY-IR) 15 mg immediate release tablet      methadone (DOLOPHINE) 10 mg tablet   4. Neuropathic pain M79.2 729.2           Ordered nexwave unit to be applied to affected area daily as needed for pain.     Patient to use lidocaine patches 4% to be applied to affected area for 12 hours a day and then remove for 12 hours pain. Patient currently on methadone 10 mg tablet, take 2 tablets by mouth 4 times daily for pain and oxycodone IR 15 mg tablet, take 1 tablet by mouth 4 times daily as needed for pain. Plan to continue to wean opioids by approximately 10% of morphine equivalents monthly with end goal of 0 opioids. With regards to patient's safety and tolerance. Patient to call if she experiences any withdrawal symptoms (chills, sweating, irritation, nausea, vomiting, diarrhea). Today we will prescribe methadone 10 mg tablet take 2 tablets in the a.m. 1 tablet in the afternoon, 2 tablets in the evening, 2 tablets at bedtime for total of 7 tablets daily, and oxycodone IR 15 mg tablet take 1 tablet by mouth 4 times daily as needed for pain. New     Next prescription will be,methadone 10 mg tablet take 2 tablets in the a.m. 2 tablet in the afternoon, 2 tablets in the evening,for total of 6 tablets daily, and oxycodone IR 15 mg tablet take 1 tablet by mouth 4 times daily as needed for pain. Pending safety and compliance    Following prescription will be methadone 10 mg tablets take 2 tablets in the a.m. 1 tablet in the afternoon and 2 tablets in the evening for a total of 5 tablets daily and oxycodone IR 15 mg tablet take 1 tablet by mouth 4 times daily as needed for pain. New     Next office visit, we will prescribe methadone 10 mg tablets take 2 tablets in the a.m., 1 tablet in the afternoon and 2 tablets in the evening for a total of 5 tablets daily and oxycodone IR 15 mg tablet take 1 tablet 4 times daily as needed for pain. New     Pt has upcoming placed for reach prosthesis orthotics for left transfemoral prosthetic assessment and adjustment. Patient to continue to regular follow-ups with pulmonologist and PCP    Patient to speak with PCP about increasing gabapentin dose.     Consider pain psychology, CBT, physical therapy, RFA, consult for nerve block to left AKA    Follow up ongoing assessment and ongoing development of integrative and comprehensive plan of care for chronic pain. GOALS:  To establish complementary and integrative plan of care to address chronic pain issues while minimizing pharmaceuticals to maximize patient's function improve quality of life. Education:  Patient again educated on the importance of strict compliance with the opioid care agreement while on opioid therapy. Patient also again educated that they should avoid driving while on chronic opioid therapy. Also advised to avoid alcohol and to avoid benzodiazepines while on opioid therapy. Handouts given for opioid safety and sleep health. Patient Homework:  Continue to independently investigate yoga for persons with chronic pain. F/u:. Follow-up Disposition:  Return in about 3 months (around 11/24/2018).

## 2018-09-24 NOTE — TELEPHONE ENCOUNTER
Medicine request came in as \"URGENT\" [id number]  2:53PM    1. Name,  verified  2. Medicine requested - methadone, oxycodone IR  3.  [phone]  4. Analgesia - 70% pain relief  5. ADL - some days can, some days cannot  6.   Adverse side effects from medicine - none

## 2018-09-26 NOTE — TELEPHONE ENCOUNTER
Chasity Chirinos has called requesting a refill of their controlled medication, methadone and oxycodone, for the management of phantom pain. Last office visit date: 8/24/18/with David Lezama, next 11/19/18 with David Lezama    Date last  was pulled and reviewed : 9/26/18 last filled both on 8/31/18    Was the patient compliant when the above report was pulled? yes    Analgesia: 70%    Aberrancies: none    ADL's: some days yes and some days can not    Adverse Reaction: none    Provider's last note and plan of care reviewed? yes  Request forwarded to provider for review.

## 2020-01-31 NOTE — PROGRESS NOTES
Nursing Notes    Patient presents to the office today in follow-up. Patient rates her pain at 7/10 on the numerical pain scale. Reviewed medications with counts as follows:    Rx Date filled Qty Dispensed Pill Count Last Dose Short   Methadone HCL 10mg 06/03/17 67 44 today no   roxicodone HCL 15mg 06/3/17 120 22 today no                                  Comments:     POC UDS was not performed in office today    Any new labs or imaging since last appointment? YES, labs and CXR     Have you been to an emergency room (ER) or urgent care clinic since your last visit? NO            Have you been hospitalized since your last visit? NO     If yes, where, when, and reason for visit? Have you seen or consulted any other health care providers outside of the [address] [place] [address]  since your last visit? YES, PCP ,cardiology,  oncology     If yes, where, when, and reason for visit? HM deferred to pcp. Surgery NP Progress Note    s/p CHOLECYSTECTOMY LAPAROSCOPIC WITH GRAMS on 2020       Assessment:   Active Problems:    Biliary dyskinesia (2020)      Oxygen desaturation (2020)        Expected post-op progress slowed by nausea, now resolved. Plan/Recommendations/Medical Decision Making:     - Mobilize with nursing and OOB to chair for meals  - Continue diet  - Pain management- Continue current pain control methods.   - VTE Prophylaxis: Lovenox  - Home today no needs. Subjective:     Patient has no new complaints. Pain control adequate. Patient reports PO intake adequate. No nausea/vomiting. Positive flatus. Positive stool output. Voiding status: Patient is voiding in adequate amounts. Objective:     Blood pressure 118/71, pulse 60, temperature 97.7 °F (36.5 °C), resp. rate 18, height 5' 3\" (1.6 m), weight 85 kg (187 lb 6.3 oz), last menstrual period 2019, SpO2 92 %. Temp (24hrs), Av.6 °F (36.4 °C), Min:97.2 °F (36.2 °C), Max:97.8 °F (36.6 °C)      Recent Results (from the past 48 hour(s))   HCG URINE, QL. - POC    Collection Time: 20  3:44 PM   Result Value Ref Range    Pregnancy test,urine (POC) NEGATIVE  NEG     CBC WITH AUTOMATED DIFF    Collection Time: 20  3:41 AM   Result Value Ref Range    WBC 7.0 3.6 - 11.0 K/uL    RBC 3.76 (L) 3.80 - 5.20 M/uL    HGB 11.8 11.5 - 16.0 g/dL    HCT 34.9 (L) 35.0 - 47.0 %    MCV 92.8 80.0 - 99.0 FL    MCH 31.4 26.0 - 34.0 PG    MCHC 33.8 30.0 - 36.5 g/dL    RDW 11.8 11.5 - 14.5 %    PLATELET 133 313 - 807 K/uL    MPV 11.6 8.9 - 12.9 FL    NRBC 0.0 0  WBC    ABSOLUTE NRBC 0.00 0.00 - 0.01 K/uL    NEUTROPHILS 51 32 - 75 %    LYMPHOCYTES 41 12 - 49 %    MONOCYTES 5 5 - 13 %    EOSINOPHILS 2 0 - 7 %    BASOPHILS 1 0 - 1 %    IMMATURE GRANULOCYTES 0 0.0 - 0.5 %    ABS. NEUTROPHILS 3.6 1.8 - 8.0 K/UL    ABS. LYMPHOCYTES 2.8 0.8 - 3.5 K/UL    ABS. MONOCYTES 0.4 0.0 - 1.0 K/UL    ABS.  EOSINOPHILS 0.1 0.0 - 0.4 K/UL ABS. BASOPHILS 0.0 0.0 - 0.1 K/UL    ABS. IMM. GRANS. 0.0 0.00 - 0.04 K/UL    DF AUTOMATED     LIPASE    Collection Time: 01/31/20  3:41 AM   Result Value Ref Range    Lipase 138 73 - 632 U/L   METABOLIC PANEL, COMPREHENSIVE    Collection Time: 01/31/20  3:41 AM   Result Value Ref Range    Sodium 141 136 - 145 mmol/L    Potassium 4.0 3.5 - 5.1 mmol/L    Chloride 110 (H) 97 - 108 mmol/L    CO2 29 21 - 32 mmol/L    Anion gap 2 (L) 5 - 15 mmol/L    Glucose 101 (H) 65 - 100 mg/dL    BUN 13 6 - 20 MG/DL    Creatinine 0.93 0.55 - 1.02 MG/DL    BUN/Creatinine ratio 14 12 - 20      GFR est AA >60 >60 ml/min/1.73m2    GFR est non-AA >60 >60 ml/min/1.73m2    Calcium 7.6 (L) 8.5 - 10.1 MG/DL    Bilirubin, total 0.3 0.2 - 1.0 MG/DL    ALT (SGPT) 67 12 - 78 U/L    AST (SGOT) 31 15 - 37 U/L    Alk. phosphatase 63 45 - 117 U/L    Protein, total 5.8 (L) 6.4 - 8.2 g/dL    Albumin 3.1 (L) 3.5 - 5.0 g/dL    Globulin 2.7 2.0 - 4.0 g/dL    A-G Ratio 1.1 1.1 - 2.2         Pt resting in bed. NAD   Incisions CDI. SCDs for mechanical DVT proph while in bed    Body mass index is 33.19 kg/m². Reference: BMI greater than 30 is classified as obesity and greater than 40 is classified as morbid obesity.      Last 3 Recorded Weights in this Encounter    01/29/20 1525 01/30/20 0337   Weight: 84.5 kg (186 lb 4.6 oz) 85 kg (187 lb 6.3 oz)         Negin Wills, JANIS   MSN, APRN, FNP-C, CWOCN-AP    01/31/20

## (undated) DEVICE — SOLUTION IRRIG 3000ML 0.9% SOD CHL FLX CONT 0797208] ICU MEDICAL INC]

## (undated) DEVICE — CATH URET AXXCESS 6FRX70CM -- BX/10

## (undated) DEVICE — STERILE LATEX POWDER-FREE SURGICAL GLOVESWITH NITRILE COATING: Brand: PROTEXIS

## (undated) DEVICE — UTILITY MARKER,BLACK WITH LABELS: Brand: DEVON

## (undated) DEVICE — SPONGE GZ W4XL4IN COT 12 PLY TYP VII WVN C FLD DSGN

## (undated) DEVICE — DRAPE XR C ARM 41X74IN LF --

## (undated) DEVICE — INFLATION DEVICE: Brand: ENCORE 26

## (undated) DEVICE — STERILE MEDICINE CUP 2 OZ KIT: Brand: CARDINAL HEALTH

## (undated) DEVICE — (D)SYR 10ML 1/5ML GRAD NSAF -- PKGING CHANGE USE ITEM 338027

## (undated) DEVICE — CYSTO PACK: Brand: MEDLINE INDUSTRIES, INC.

## (undated) DEVICE — DEVON™ KNEE AND BODY STRAP 60" X 3" (1.5 M X 7.6 CM): Brand: DEVON

## (undated) DEVICE — SYR LR LCK 1ML GRAD NSAF 30ML --

## (undated) DEVICE — GDWIRE 3CM FLX-TIP 0.038X150CM -- BX/5 SENSOR

## (undated) DEVICE — DRAPE TWL SURG 16X26IN BLU ORB04] ALLCARE INC]

## (undated) DEVICE — Y-TYPE TUR/BLADDER IRRIGATION SET, REGULATING CLAMP

## (undated) DEVICE — SOL IRR STRL H2O 1500ML BTL --

## (undated) DEVICE — PACK,CYSTOSCOPY,PK III,AURORA: Brand: MEDLINE

## (undated) DEVICE — TRAY PREP DRY W/ PREM GLV 2 APPL 6 SPNG 2 UNDPD 1 OVERWRAP

## (undated) DEVICE — BALLOON DILATATION CATHETER: Brand: UROMAX ULTRA

## (undated) DEVICE — URETERAL ACCESS SHEATH SET: Brand: NAVIGATOR HD

## (undated) DEVICE — CATHETER URET L70CM OD6FR OPN END FLEXIMA